# Patient Record
Sex: MALE | Race: WHITE | NOT HISPANIC OR LATINO | Employment: FULL TIME | URBAN - METROPOLITAN AREA
[De-identification: names, ages, dates, MRNs, and addresses within clinical notes are randomized per-mention and may not be internally consistent; named-entity substitution may affect disease eponyms.]

---

## 2019-04-12 ENCOUNTER — CONVERSION ENCOUNTER (OUTPATIENT)
Dept: SURGERY | Facility: CLINIC | Age: 54
End: 2019-04-12

## 2019-04-12 ENCOUNTER — OFFICE VISIT CONVERTED (OUTPATIENT)
Dept: SURGERY | Facility: CLINIC | Age: 54
End: 2019-04-12
Attending: UROLOGY

## 2019-08-29 ENCOUNTER — OFFICE VISIT CONVERTED (OUTPATIENT)
Dept: SURGERY | Facility: CLINIC | Age: 54
End: 2019-08-29
Attending: NURSE PRACTITIONER

## 2020-01-17 ENCOUNTER — HOSPITAL ENCOUNTER (OUTPATIENT)
Dept: SURGERY | Facility: HOSPITAL | Age: 55
Setting detail: HOSPITAL OUTPATIENT SURGERY
Discharge: HOME | End: 2020-01-17
Attending: SURGERY

## 2020-09-11 ENCOUNTER — OFFICE VISIT CONVERTED (OUTPATIENT)
Dept: UROLOGY | Facility: CLINIC | Age: 55
End: 2020-09-11
Attending: UROLOGY

## 2020-10-02 ENCOUNTER — PROCEDURE VISIT CONVERTED (OUTPATIENT)
Dept: UROLOGY | Facility: CLINIC | Age: 55
End: 2020-10-02
Attending: UROLOGY

## 2020-10-02 ENCOUNTER — HOSPITAL ENCOUNTER (OUTPATIENT)
Dept: SURGERY | Facility: CLINIC | Age: 55
Discharge: HOME | End: 2020-10-02
Attending: UROLOGY

## 2021-05-10 NOTE — PROCEDURES
Procedure Note      Patient Name: Reji Samuel   Patient ID: [id number]   Sex: Male   YOB: 1965    Primary Care Provider: Tan Londono MD   Referring Provider: Tan Londono MD    Visit Date: October 2, 2020    Provider: Edi Pena MD   Location: [facility] [place] [facility]   Location Address: [address] [place] [address]  [id number]   Location Phone: [phone]             After informed consent patient was taken to the procedure room.    Patient was placed supine and prepped and draped in normal sterile fashion.  Multidisciplinary timeout was been taken documenting correct patient site and procedure.  Patient had 2 lesions on the proximal left side of the penis.  1 was very small about 1 mm.  I used some 1% lidocaine plain to numb this up and excised this lesion and fulgurated the bleeding.  The second lesion was on the left lateral side was about 7 mm long and 2 cm wide.  This was numbed up with 1% lidocaine without epinephrine and then resected in an ellipse fashion.  Bleeding was fulgurated and 4-0 chromic horizontal mattress sutures were used to close the incision.    Patient tolerated procedure well we placed some bacitracin and gauze on the incision           Assessment  · Penile lesion     607.89/N48.9  · Condyloma acuminatum of penis     078.11/A63.0      Plan  · Orders  o Penile biopsy (01507) - 607.89/N48.9 - 10/02/2020  o Surgical removal of condyloma of penis (77769) - 607.89/N48.9, 078.11/A63.0 - 10/02/2020  · Medications  o Medications have been Reconciled  o Transition of Care or Provider Policy     Follow-up as needed             Electronically Signed by: Edi Pena MD -Author on October 2, 2020 05:19:54 PM

## 2021-05-13 NOTE — PROGRESS NOTES
Progress Note      Patient Name: Reji Samuel   Patient ID: [id number]   Sex: Male   YOB: 1965    Primary Care Provider: Tan Londono MD   Referring Provider: Tan Londono MD    Visit Date: September 11, 2020    Provider: Edi Pena MD   Location: [facility] [place] [facility]   Location Address: [address] [place] [address]  [id number]   Location Phone: [phone]          Chief Complaint  · pt here for urologic issues      History Of Present Illness     53yo male presents in follow up for genital warts and ED    Pt with some intermittent genital condyloma over the last few years.    Has had these burned off and also used imiquimod cream in the past.  He has had some insurance issues with the cream in the past.    pt also dealing with PE    Both these were, but he has had recurrence.    has used a cream on these in the past and this has worked.     erectile issues. Can get erections good enough for erections. difficulty getting but mostly maintaining an erection.     Sildenafil 10 mg - did help.     No urologic family history,   Has never had any urologic surgery.    No cardiopulmonary history.  0.5 ppd.  Patient does not use blood thinner.         Past Medical History  HTN (hypertension); Reflux; STD (male)         Past Surgical History  Appendectomy; Colon Polyps; Tonsillectomy; Tumor removal         Medication List  aspirin 81 mg oral tablet,delayed release (DR/EC); imiquimod 5 % topical cream in packet; lisinopril-hydrochlorothiazide 10-12.5 mg oral tablet; Prilosec 10 mg oral susp,delayed release for recon; sildenafil 20 mg; Zyrtec 10 mg oral tablet         Allergy List  Anesthetic; Codeine Sulfate       Allergies Reconciled  Family Medical History  Hypertension         Social History  Tobacco (Current every day)         Review of Systems  · Constitutional  o Denies  o : chills, fever  · Gastrointestinal  o Denies  o : nausea, vomiting      Vitals  Date Time BP  "Position Site L\R Cuff Size HR RR TEMP (F) WT  HT  BMI kg/m2 BSA m2 O2 Sat        09/11/2020 10:27 AM       17  227lbs 6oz 6'  1\" 30 2.3           Physical Examination  · Constitutional  o Appearance  o : Well-appearing, well-developed, in no acute distress  · Respiratory  o Respiratory Effort  o : Unlabored breathing  · Gastrointestinal  o Abdominal Examination  o : Nontender, nondistended, no rigidity or guarding, no hepatosplenomegaly  · Genitourinary  o Bladder  o : nonpalpable  o Penis  o : circumcised phallus with no masses or lesions  o Urethral Meatus  o : no inflammation present and no stenosis  o Scrotum and Scrotal Contents  o :   § Testes  § : Bilateral descended testicles no masses or lesions  · Neurologic  o Mental Status Examination  o :   § Orientation  § : Grossly oriented to person, place and time, judgment and insight intact, normal mood and affect         Patient has 2 condylomas on the left proximal shaft.  One is 2 mm and one is about 5 mm long by 2 mm wide.                         Assessment  · Genital warts     078.11/A63.0  · Erectile dysfunction, unspecified erectile dysfunction type     607.84/N52.9    Problems Reconciled  Plan  · Medications  o Medications have been Reconciled  o Transition of Care or Provider Policy  · Instructions  o Electronically Identified Patient Education Materials Provided Electronically     4 hours to go the emergency room or risk never having  an erection again      Follow-up in 1 year with nurse practitioner for med refill    Greater than 20 minutes was used in counseling and coordination of care, with greater than 51% of this in face-to-face counseling\">  Genital Condyloma    Imiquimod 5% topical cream packet, apply 3 times a week nightly.  No longer than 16 weeks  Risks/Benefit and side effect discussed today    We did discuss if this does not work we could schedule him for office excision of these lesions.  He will call and let me know if he wants to do " this in the future.    ED    Sildenafil 20 mg 1 to 5 tablets PRN sexual intercourse.  Risks/benefits and side effects discuss today.  Discussed with patient if he gets an erections for > 4 hours to go the emergency room or risk never having  an erection again      Follow-up in 1 year with nurse practitioner for med refill    Greater than 20 minutes was used in counseling and coordination of care, with greater than 51% of this in face-to-face counseling             Electronically Signed by: Edi Pena MD -Author on September 11, 2020 12:34:30 PM

## 2021-05-14 VITALS — RESPIRATION RATE: 17 BRPM | WEIGHT: 227.37 LBS | BODY MASS INDEX: 30.13 KG/M2 | HEIGHT: 73 IN

## 2021-05-15 VITALS — HEIGHT: 73 IN | RESPIRATION RATE: 16 BRPM | BODY MASS INDEX: 30.48 KG/M2 | WEIGHT: 230 LBS

## 2021-05-15 VITALS — BODY MASS INDEX: 29.88 KG/M2 | WEIGHT: 225.5 LBS | HEIGHT: 73 IN | RESPIRATION RATE: 14 BRPM

## 2021-09-14 ENCOUNTER — OFFICE VISIT (OUTPATIENT)
Dept: UROLOGY | Facility: CLINIC | Age: 56
End: 2021-09-14

## 2021-09-14 VITALS — BODY MASS INDEX: 29.31 KG/M2 | WEIGHT: 221.2 LBS | RESPIRATION RATE: 16 BRPM | HEIGHT: 73 IN

## 2021-09-14 DIAGNOSIS — N50.811 PAIN IN RIGHT TESTICLE: Primary | ICD-10-CM

## 2021-09-14 PROBLEM — A64 STD (MALE): Status: ACTIVE | Noted: 2021-09-14

## 2021-09-14 PROBLEM — I10 HTN (HYPERTENSION): Status: ACTIVE | Noted: 2021-09-14

## 2021-09-14 PROBLEM — K21.9 ESOPHAGEAL REFLUX: Status: ACTIVE | Noted: 2021-09-14

## 2021-09-14 LAB
BILIRUB BLD-MCNC: NEGATIVE MG/DL
CLARITY, POC: CLEAR
COLOR UR: YELLOW
GLUCOSE UR STRIP-MCNC: NEGATIVE MG/DL
KETONES UR QL: NEGATIVE
LEUKOCYTE EST, POC: NEGATIVE
NITRITE UR-MCNC: NEGATIVE MG/ML
PH UR: 7.5 [PH] (ref 5–8)
PROT UR STRIP-MCNC: NEGATIVE MG/DL
RBC # UR STRIP: ABNORMAL /UL
SP GR UR: 1.02 (ref 1–1.03)
UROBILINOGEN UR QL: NORMAL

## 2021-09-14 PROCEDURE — 99214 OFFICE O/P EST MOD 30 MIN: CPT | Performed by: NURSE PRACTITIONER

## 2021-09-14 PROCEDURE — 81003 URINALYSIS AUTO W/O SCOPE: CPT | Performed by: NURSE PRACTITIONER

## 2021-09-14 RX ORDER — ASPIRIN 81 MG/1
81 TABLET ORAL DAILY
Status: ON HOLD | COMMUNITY
End: 2021-09-23 | Stop reason: SDUPTHER

## 2021-09-14 RX ORDER — IBUPROFEN 800 MG/1
TABLET ORAL
COMMUNITY
Start: 2021-07-13 | End: 2021-09-21

## 2021-09-14 RX ORDER — LISINOPRIL AND HYDROCHLOROTHIAZIDE 25; 20 MG/1; MG/1
1 TABLET ORAL DAILY
COMMUNITY
Start: 2021-09-11 | End: 2021-09-23 | Stop reason: HOSPADM

## 2021-09-14 RX ORDER — CYCLOBENZAPRINE HCL 10 MG
10 TABLET ORAL 2 TIMES DAILY PRN
COMMUNITY
Start: 2021-06-08 | End: 2022-03-31

## 2021-09-14 RX ORDER — DOXYCYCLINE HYCLATE 100 MG/1
100 CAPSULE ORAL 2 TIMES DAILY
Qty: 42 CAPSULE | Refills: 0 | Status: SHIPPED | OUTPATIENT
Start: 2021-09-14 | End: 2021-10-05

## 2021-09-14 RX ORDER — CETIRIZINE HYDROCHLORIDE 10 MG/1
10 TABLET ORAL DAILY
COMMUNITY

## 2021-09-14 RX ORDER — OMEPRAZOLE 20 MG/1
20 CAPSULE, DELAYED RELEASE ORAL DAILY PRN
COMMUNITY

## 2021-09-14 RX ORDER — OMEPRAZOLE MAGNESIUM 10 MG/1
GRANULE, DELAYED RELEASE ORAL
COMMUNITY
End: 2021-09-14

## 2021-09-14 RX ORDER — SILDENAFIL CITRATE 20 MG/1
TABLET ORAL
Qty: 30 TABLET | Refills: 4 | Status: SHIPPED | OUTPATIENT
Start: 2021-09-14

## 2021-09-14 NOTE — PROGRESS NOTES
"Chief Complaint: Testicle Pain (having groin pain ) and Annual Exam (needing refill)    Subjective         History of Present Illness  Reji Samuel is a 55 y.o. male presents to [facility] to be seen for annual exam for condyloma and erectile dysfunction patient also states that he has had a 2-month history of testicular pain which resolves with ejaculation.    He has been on sildenafil.    States testicular pain started about 6 weeks ago with the right testicle having pain. He states that when he was able to ejaculate he had some spraying and then relief of the pain.    He states there is also a \"lump\" in his right groin.     Objective     Past Medical History:   Diagnosis Date   • Acid reflux    • HTN (hypertension)    • STD (male)        Past Surgical History:   Procedure Laterality Date   • APPENDECTOMY     • COLON SURGERY      polyps   • TONSILLECTOMY     • TUMOR REMOVAL           Current Outpatient Medications:   •  aspirin (aspirin) 81 MG EC tablet, aspirin 81 mg oral tablet,delayed release (DR/EC) take 1 tablet (81 mg) by oral route once daily   Active, Disp: , Rfl:   •  cetirizine (ZyrTEC Allergy) 10 MG tablet, Zyrtec 10 mg oral tablet take 1 tablet (10 mg) by oral route once daily   Active, Disp: , Rfl:   •  cyclobenzaprine (FLEXERIL) 10 MG tablet, , Disp: , Rfl:   •  doxycycline (VIBRAMYCIN) 100 MG capsule, Take 1 capsule by mouth 2 (Two) Times a Day for 21 days., Disp: 42 capsule, Rfl: 0  •  ibuprofen (ADVIL,MOTRIN) 800 MG tablet, , Disp: , Rfl:   •  lisinopril-hydrochlorothiazide (PRINZIDE,ZESTORETIC) 20-25 MG per tablet, , Disp: , Rfl:   •  omeprazole (priLOSEC) 20 MG capsule, , Disp: , Rfl:   •  sildenafil (REVATIO) 20 MG tablet, 1-5 tabs PRN sexual intercourse, Disp: 30 tablet, Rfl: 4    Allergies   Allergen Reactions   • Benzocaine Unknown - Low Severity   • Codeine Unknown - Low Severity        Family History   Problem Relation Age of Onset   • Hypertension Mother    • " "Hypertension Father    • Prostate cancer Paternal Uncle        Social History     Socioeconomic History   • Marital status:      Spouse name: Not on file   • Number of children: Not on file   • Years of education: Not on file   • Highest education level: Not on file   Tobacco Use   • Smoking status: Current Every Day Smoker   • Smokeless tobacco: Never Used   Vaping Use   • Vaping Use: Never used       Vital Signs:   Resp 16   Ht 185.4 cm (73\")   Wt 100 kg (221 lb 3.2 oz)   BMI 29.18 kg/m²      Physical Exam  Genitourinary:     Testes:         Right: Mass (Nickel sized with tenderness to palpation closer to groin) and tenderness present.          Result Review :   The following data was reviewed by: BRIELLE Gold on 09/14/2021:  Results for orders placed or performed in visit on 09/14/21   POC Urinalysis Dipstick, Automated    Specimen: Urine   Result Value Ref Range    Color Yellow Yellow, Straw, Dark Yellow, Yulia    Clarity, UA Clear Clear    Specific Gravity  1.020 1.005 - 1.030    pH, Urine 7.5 5.0 - 8.0    Leukocytes Negative Negative    Nitrite, UA Negative Negative    Protein, POC Negative Negative mg/dL    Glucose, UA Negative Negative, 1000 mg/dL (3+) mg/dL    Ketones, UA Negative Negative    Urobilinogen, UA Normal Normal    Bilirubin Negative Negative    Blood, UA Trace (A) Negative          Procedures        Assessment and Plan {CC Problem List  Visit Diagnosis  ROS  Review (Popup)  Health Maintenance  Quality  BestPractice  Medications  SmartSets  SnapShot Encounters  Media :23}   Diagnoses and all orders for this visit:    1. Pain in right testicle (Primary)  -     POC Urinalysis Dipstick, Automated  -     doxycycline (VIBRAMYCIN) 100 MG capsule; Take 1 capsule by mouth 2 (Two) Times a Day for 21 days.  Dispense: 42 capsule; Refill: 0  -     US Scrotum & Testicles; Future  -     sildenafil (REVATIO) 20 MG tablet; 1-5 tabs PRN sexual intercourse  Dispense: 30 tablet; " Refill: 4         Discussed with patient at this point in time his testicular pain may be related to a possible infectious or inflammatory issue however given palpable mass on right testicle/inguinal area we will order a testicular ultrasound and follow-up with him in 4 weeks.      I spent 10  minutes caring for Reji on this date of service. This time includes time spent by me in the following activities:reviewing tests, obtaining and/or reviewing a separately obtained history, performing a medically appropriate examination and/or evaluation , counseling and educating the patient/family/caregiver, ordering medications, tests, or procedures, and documenting information in the medical record  Follow Up   Return in about 4 weeks (around 10/12/2021) for f/u testicular pain .  Patient was given instructions and counseling regarding his condition or for health maintenance advice. Please see specific information pulled into the AVS if appropriate.         This document has been electronically signed by BRIELLE Gold  September 14, 2021 11:25 EDT

## 2021-09-21 ENCOUNTER — APPOINTMENT (OUTPATIENT)
Dept: CARDIOLOGY | Facility: HOSPITAL | Age: 56
End: 2021-09-21

## 2021-09-21 ENCOUNTER — HOSPITAL ENCOUNTER (INPATIENT)
Facility: HOSPITAL | Age: 56
LOS: 1 days | Discharge: HOME | End: 2021-09-23
Attending: EMERGENCY MEDICINE | Admitting: INTERNAL MEDICINE

## 2021-09-21 ENCOUNTER — APPOINTMENT (OUTPATIENT)
Dept: GENERAL RADIOLOGY | Facility: HOSPITAL | Age: 56
End: 2021-09-21

## 2021-09-21 DIAGNOSIS — I21.4 NSTEMI, INITIAL EPISODE OF CARE (HCC): ICD-10-CM

## 2021-09-21 DIAGNOSIS — R07.9 CHEST PAIN, UNSPECIFIED TYPE: Primary | ICD-10-CM

## 2021-09-21 PROBLEM — IMO0001 SMOKING: Status: ACTIVE | Noted: 2021-09-21

## 2021-09-21 PROBLEM — F41.9 ANXIETY: Status: ACTIVE | Noted: 2021-09-21

## 2021-09-21 PROBLEM — F17.200 SMOKING: Status: ACTIVE | Noted: 2021-09-21

## 2021-09-21 LAB
ALBUMIN SERPL-MCNC: 4.3 G/DL (ref 3.5–5.2)
ALBUMIN/GLOB SERPL: 2 G/DL
ALP SERPL-CCNC: 52 U/L (ref 39–117)
ALT SERPL W P-5'-P-CCNC: 25 U/L (ref 1–41)
ANION GAP SERPL CALCULATED.3IONS-SCNC: 9.7 MMOL/L (ref 5–15)
AST SERPL-CCNC: 23 U/L (ref 1–40)
BASOPHILS # BLD AUTO: 0.12 10*3/MM3 (ref 0–0.2)
BASOPHILS NFR BLD AUTO: 0.9 % (ref 0–1.5)
BH CV ECHO MEAS - AO ROOT DIAM: 2.2 CM
BH CV ECHO MEAS - EDV(MOD-SP2): 89 ML
BH CV ECHO MEAS - EDV(MOD-SP4): 87 ML
BH CV ECHO MEAS - EF(MOD-BP): 64 %
BH CV ECHO MEAS - ESV(MOD-SP2): 28 ML
BH CV ECHO MEAS - ESV(MOD-SP4): 37 ML
BH CV ECHO MEAS - IVSD: 1.1 CM
BH CV ECHO MEAS - LA DIMENSION(2D): 2.2 CM
BH CV ECHO MEAS - LAT PEAK E' VEL: 9 CM/SEC
BH CV ECHO MEAS - LVIDD: 4 CM
BH CV ECHO MEAS - LVIDS: 2.1 CM
BH CV ECHO MEAS - LVPWD: 0.9 CM
BH CV ECHO MEAS - MED PEAK E' VEL: 6 CM/SEC
BH CV ECHO MEAS - MV A MAX VEL: 47 CM/SEC
BH CV ECHO MEAS - MV DEC TIME: 211 MSEC
BH CV ECHO MEAS - MV E MAX VEL: 44 CM/SEC
BH CV ECHO MEAS - MV E/A: 0.9
BH CV ECHO MEASUREMENTS AVERAGE E/E' RATIO: 5.87
BILIRUB SERPL-MCNC: 0.4 MG/DL (ref 0–1.2)
BUN SERPL-MCNC: 13 MG/DL (ref 6–20)
BUN/CREAT SERPL: 13 (ref 7–25)
CALCIUM SPEC-SCNC: 9.1 MG/DL (ref 8.6–10.5)
CHLORIDE SERPL-SCNC: 101 MMOL/L (ref 98–107)
CK MB SERPL-CCNC: 2.9 NG/ML
CK SERPL-CCNC: 236 U/L (ref 20–200)
CO2 SERPL-SCNC: 26.3 MMOL/L (ref 22–29)
CREAT SERPL-MCNC: 1 MG/DL (ref 0.76–1.27)
DEPRECATED RDW RBC AUTO: 44.9 FL (ref 37–54)
EOSINOPHIL # BLD AUTO: 0.56 10*3/MM3 (ref 0–0.4)
EOSINOPHIL NFR BLD AUTO: 4 % (ref 0.3–6.2)
ERYTHROCYTE [DISTWIDTH] IN BLOOD BY AUTOMATED COUNT: 13.4 % (ref 12.3–15.4)
GFR SERPL CREATININE-BSD FRML MDRD: 78 ML/MIN/1.73
GLOBULIN UR ELPH-MCNC: 2.2 GM/DL
GLUCOSE SERPL-MCNC: 130 MG/DL (ref 65–99)
HCT VFR BLD AUTO: 45.4 % (ref 37.5–51)
HGB BLD-MCNC: 16.1 G/DL (ref 13–17.7)
HOLD SPECIMEN: NORMAL
IMM GRANULOCYTES # BLD AUTO: 0.06 10*3/MM3 (ref 0–0.05)
IMM GRANULOCYTES NFR BLD AUTO: 0.4 % (ref 0–0.5)
IVRT: 66 MSEC
LEFT ATRIUM VOLUME INDEX: 15 ML/M2
LIPASE SERPL-CCNC: 41 U/L (ref 13–60)
LYMPHOCYTES # BLD AUTO: 5.15 10*3/MM3 (ref 0.7–3.1)
LYMPHOCYTES NFR BLD AUTO: 36.6 % (ref 19.6–45.3)
MAGNESIUM SERPL-MCNC: 1.9 MG/DL (ref 1.6–2.6)
MAXIMAL PREDICTED HEART RATE: 165 BPM
MCH RBC QN AUTO: 32.5 PG (ref 26.6–33)
MCHC RBC AUTO-ENTMCNC: 35.5 G/DL (ref 31.5–35.7)
MCV RBC AUTO: 91.5 FL (ref 79–97)
MONOCYTES # BLD AUTO: 1.19 10*3/MM3 (ref 0.1–0.9)
MONOCYTES NFR BLD AUTO: 8.5 % (ref 5–12)
NEUTROPHILS NFR BLD AUTO: 49.6 % (ref 42.7–76)
NEUTROPHILS NFR BLD AUTO: 6.98 10*3/MM3 (ref 1.7–7)
NRBC BLD AUTO-RTO: 0 /100 WBC (ref 0–0.2)
NT-PROBNP SERPL-MCNC: 38.2 PG/ML (ref 0–900)
PLATELET # BLD AUTO: 291 10*3/MM3 (ref 140–450)
PMV BLD AUTO: 9.4 FL (ref 6–12)
POTASSIUM SERPL-SCNC: 3.5 MMOL/L (ref 3.5–5.2)
PROT SERPL-MCNC: 6.5 G/DL (ref 6–8.5)
QT INTERVAL: 419 MS
QT INTERVAL: 443 MS
RBC # BLD AUTO: 4.96 10*6/MM3 (ref 4.14–5.8)
SODIUM SERPL-SCNC: 137 MMOL/L (ref 136–145)
STRESS TARGET HR: 140 BPM
TROPONIN I SERPL-MCNC: 0.07 NG/ML (ref 0–0.6)
TROPONIN I SERPL-MCNC: 0.24 NG/ML (ref 0–0.6)
TROPONIN T SERPL-MCNC: 0.08 NG/ML (ref 0–0.03)
TROPONIN T SERPL-MCNC: 0.1 NG/ML (ref 0–0.03)
WBC # BLD AUTO: 14.06 10*3/MM3 (ref 3.4–10.8)
WHOLE BLOOD HOLD SPECIMEN: NORMAL
WHOLE BLOOD HOLD SPECIMEN: NORMAL

## 2021-09-21 PROCEDURE — 80053 COMPREHEN METABOLIC PANEL: CPT | Performed by: EMERGENCY MEDICINE

## 2021-09-21 PROCEDURE — G0378 HOSPITAL OBSERVATION PER HR: HCPCS

## 2021-09-21 PROCEDURE — 25010000002 MORPHINE PER 10 MG: Performed by: INTERNAL MEDICINE

## 2021-09-21 PROCEDURE — 83690 ASSAY OF LIPASE: CPT | Performed by: EMERGENCY MEDICINE

## 2021-09-21 PROCEDURE — 83880 ASSAY OF NATRIURETIC PEPTIDE: CPT | Performed by: EMERGENCY MEDICINE

## 2021-09-21 PROCEDURE — 93005 ELECTROCARDIOGRAM TRACING: CPT | Performed by: EMERGENCY MEDICINE

## 2021-09-21 PROCEDURE — 93306 TTE W/DOPPLER COMPLETE: CPT

## 2021-09-21 PROCEDURE — 99244 OFF/OP CNSLTJ NEW/EST MOD 40: CPT | Performed by: INTERNAL MEDICINE

## 2021-09-21 PROCEDURE — 36415 COLL VENOUS BLD VENIPUNCTURE: CPT | Performed by: INTERNAL MEDICINE

## 2021-09-21 PROCEDURE — 83735 ASSAY OF MAGNESIUM: CPT | Performed by: EMERGENCY MEDICINE

## 2021-09-21 PROCEDURE — 84484 ASSAY OF TROPONIN QUANT: CPT | Performed by: INTERNAL MEDICINE

## 2021-09-21 PROCEDURE — 25010000002 ENOXAPARIN PER 10 MG: Performed by: INTERNAL MEDICINE

## 2021-09-21 PROCEDURE — 93010 ELECTROCARDIOGRAM REPORT: CPT | Performed by: INTERNAL MEDICINE

## 2021-09-21 PROCEDURE — 84484 ASSAY OF TROPONIN QUANT: CPT

## 2021-09-21 PROCEDURE — 85025 COMPLETE CBC W/AUTO DIFF WBC: CPT | Performed by: EMERGENCY MEDICINE

## 2021-09-21 PROCEDURE — 82553 CREATINE MB FRACTION: CPT | Performed by: EMERGENCY MEDICINE

## 2021-09-21 PROCEDURE — 82550 ASSAY OF CK (CPK): CPT | Performed by: EMERGENCY MEDICINE

## 2021-09-21 PROCEDURE — 93306 TTE W/DOPPLER COMPLETE: CPT | Performed by: INTERNAL MEDICINE

## 2021-09-21 PROCEDURE — 25010000002 MORPHINE PER 10 MG: Performed by: EMERGENCY MEDICINE

## 2021-09-21 PROCEDURE — 99223 1ST HOSP IP/OBS HIGH 75: CPT | Performed by: INTERNAL MEDICINE

## 2021-09-21 PROCEDURE — 71045 X-RAY EXAM CHEST 1 VIEW: CPT

## 2021-09-21 PROCEDURE — 99285 EMERGENCY DEPT VISIT HI MDM: CPT

## 2021-09-21 RX ORDER — ALUMINA, MAGNESIA, AND SIMETHICONE 2400; 2400; 240 MG/30ML; MG/30ML; MG/30ML
15 SUSPENSION ORAL ONCE
Status: COMPLETED | OUTPATIENT
Start: 2021-09-21 | End: 2021-09-21

## 2021-09-21 RX ORDER — POLYETHYLENE GLYCOL 3350 17 G/17G
17 POWDER, FOR SOLUTION ORAL DAILY PRN
Status: DISCONTINUED | OUTPATIENT
Start: 2021-09-21 | End: 2021-09-23 | Stop reason: HOSPADM

## 2021-09-21 RX ORDER — LIDOCAINE HYDROCHLORIDE 20 MG/ML
15 SOLUTION OROPHARYNGEAL ONCE
Status: COMPLETED | OUTPATIENT
Start: 2021-09-21 | End: 2021-09-21

## 2021-09-21 RX ORDER — SODIUM CHLORIDE 0.9 % (FLUSH) 0.9 %
10 SYRINGE (ML) INJECTION AS NEEDED
Status: DISCONTINUED | OUTPATIENT
Start: 2021-09-21 | End: 2021-09-23 | Stop reason: HOSPADM

## 2021-09-21 RX ORDER — SODIUM CHLORIDE 0.9 % (FLUSH) 0.9 %
10 SYRINGE (ML) INJECTION EVERY 12 HOURS SCHEDULED
Status: DISCONTINUED | OUTPATIENT
Start: 2021-09-21 | End: 2021-09-23 | Stop reason: HOSPADM

## 2021-09-21 RX ORDER — AMOXICILLIN 250 MG
2 CAPSULE ORAL 2 TIMES DAILY
Status: DISCONTINUED | OUTPATIENT
Start: 2021-09-21 | End: 2021-09-23 | Stop reason: HOSPADM

## 2021-09-21 RX ORDER — ASPIRIN 81 MG/1
324 TABLET, CHEWABLE ORAL ONCE
Status: DISCONTINUED | OUTPATIENT
Start: 2021-09-21 | End: 2021-09-22

## 2021-09-21 RX ORDER — ATORVASTATIN CALCIUM 40 MG/1
40 TABLET, FILM COATED ORAL NIGHTLY
Status: DISCONTINUED | OUTPATIENT
Start: 2021-09-21 | End: 2021-09-23 | Stop reason: HOSPADM

## 2021-09-21 RX ORDER — ASPIRIN 81 MG/1
81 TABLET ORAL DAILY
Status: DISCONTINUED | OUTPATIENT
Start: 2021-09-22 | End: 2021-09-22

## 2021-09-21 RX ORDER — BISACODYL 5 MG/1
5 TABLET, DELAYED RELEASE ORAL DAILY PRN
Status: DISCONTINUED | OUTPATIENT
Start: 2021-09-21 | End: 2021-09-23 | Stop reason: HOSPADM

## 2021-09-21 RX ORDER — ONDANSETRON 2 MG/ML
4 INJECTION INTRAMUSCULAR; INTRAVENOUS EVERY 6 HOURS PRN
Status: DISCONTINUED | OUTPATIENT
Start: 2021-09-21 | End: 2021-09-22

## 2021-09-21 RX ORDER — METOPROLOL SUCCINATE 25 MG/1
25 TABLET, EXTENDED RELEASE ORAL
Status: DISCONTINUED | OUTPATIENT
Start: 2021-09-21 | End: 2021-09-23 | Stop reason: HOSPADM

## 2021-09-21 RX ORDER — NICOTINE 21 MG/24HR
1 PATCH, TRANSDERMAL 24 HOURS TRANSDERMAL EVERY 24 HOURS
Status: DISCONTINUED | OUTPATIENT
Start: 2021-09-21 | End: 2021-09-23 | Stop reason: HOSPADM

## 2021-09-21 RX ORDER — CYCLOBENZAPRINE HCL 5 MG
5 TABLET ORAL 3 TIMES DAILY PRN
Status: DISCONTINUED | OUTPATIENT
Start: 2021-09-21 | End: 2021-09-23 | Stop reason: HOSPADM

## 2021-09-21 RX ORDER — ASPIRIN 81 MG/1
81 TABLET ORAL DAILY
Status: DISCONTINUED | OUTPATIENT
Start: 2021-09-21 | End: 2021-09-21

## 2021-09-21 RX ORDER — PANTOPRAZOLE SODIUM 40 MG/1
40 TABLET, DELAYED RELEASE ORAL
Status: DISCONTINUED | OUTPATIENT
Start: 2021-09-21 | End: 2021-09-23 | Stop reason: HOSPADM

## 2021-09-21 RX ORDER — BISACODYL 10 MG
10 SUPPOSITORY, RECTAL RECTAL DAILY PRN
Status: DISCONTINUED | OUTPATIENT
Start: 2021-09-21 | End: 2021-09-23 | Stop reason: HOSPADM

## 2021-09-21 RX ORDER — CHOLECALCIFEROL (VITAMIN D3) 125 MCG
5 CAPSULE ORAL NIGHTLY PRN
Status: DISCONTINUED | OUTPATIENT
Start: 2021-09-21 | End: 2021-09-23 | Stop reason: HOSPADM

## 2021-09-21 RX ORDER — NITROGLYCERIN 0.4 MG/1
0.4 TABLET SUBLINGUAL
Status: DISCONTINUED | OUTPATIENT
Start: 2021-09-21 | End: 2021-09-23 | Stop reason: HOSPADM

## 2021-09-21 RX ORDER — MULTIPLE VITAMINS W/ MINERALS TAB 9MG-400MCG
1 TAB ORAL DAILY
Status: DISCONTINUED | OUTPATIENT
Start: 2021-09-21 | End: 2021-09-23 | Stop reason: HOSPADM

## 2021-09-21 RX ADMIN — MORPHINE SULFATE 4 MG: 4 INJECTION INTRAVENOUS at 08:29

## 2021-09-21 RX ADMIN — SODIUM CHLORIDE, PRESERVATIVE FREE 10 ML: 5 INJECTION INTRAVENOUS at 21:30

## 2021-09-21 RX ADMIN — Medication 5 MG: at 21:30

## 2021-09-21 RX ADMIN — NITROGLYCERIN 0.5 INCH: 20 OINTMENT TOPICAL at 08:27

## 2021-09-21 RX ADMIN — MULTIPLE VITAMINS W/ MINERALS TAB 1 TABLET: TAB at 12:19

## 2021-09-21 RX ADMIN — ATORVASTATIN CALCIUM 40 MG: 40 TABLET, FILM COATED ORAL at 21:30

## 2021-09-21 RX ADMIN — ENOXAPARIN SODIUM 100 MG: 100 INJECTION SUBCUTANEOUS at 17:34

## 2021-09-21 RX ADMIN — PANTOPRAZOLE SODIUM 40 MG: 40 TABLET, DELAYED RELEASE ORAL at 12:19

## 2021-09-21 RX ADMIN — MORPHINE SULFATE 4 MG: 4 INJECTION, SOLUTION INTRAMUSCULAR; INTRAVENOUS at 12:23

## 2021-09-21 RX ADMIN — METOPROLOL SUCCINATE 25 MG: 25 TABLET, EXTENDED RELEASE ORAL at 17:34

## 2021-09-21 RX ADMIN — LIDOCAINE HYDROCHLORIDE 15 ML: 20 SOLUTION ORAL; TOPICAL at 12:21

## 2021-09-21 RX ADMIN — SODIUM CHLORIDE, PRESERVATIVE FREE 10 ML: 5 INJECTION INTRAVENOUS at 12:22

## 2021-09-21 RX ADMIN — NICOTINE 1 PATCH: 21 PATCH, EXTENDED RELEASE TRANSDERMAL at 12:20

## 2021-09-21 RX ADMIN — ALUMINUM HYDROXIDE, MAGNESIUM HYDROXIDE, AND DIMETHICONE 15 ML: 400; 400; 40 SUSPENSION ORAL at 12:21

## 2021-09-21 NOTE — H&P
[facility]   HOSPITALIST HISTORY AND PHYSICAL  Date: 2021   Patient Name: Reji Samuel  : 1965  MRN: [id number]  Primary Care Physician:  Tan Londono MD  Date of admission: 2021    Subjective   Subjective     Chief Complaint: Chest pain    HPI:  Reji Samuel is a 55 y.o. male with a past medical history significant for hypertension, seasonal allergies, recently placed on doxycycline for epididymitis who presents with a several day history of chest pain, patient states that this first started as left arm pain and worsened to a pressure in his chest.  Both times he had severe pain he states was after eating dinner.  He did have some associated shortness of breath, and some palpitations.  He denied any diaphoresis.  Because of worsening symptoms he presented to the emergency department for further evaluation.  In the emergency department the patient underwent EKG which was negative for any ischemic changes, the patient underwent troponin x1 which was negative, the patient was pain-free after administration of nitroglycerin.  Because of his past medical history and character of symptoms the hospitalist service was asked admit for further management.      Personal History     Past Medical History:  Past Medical History:   Diagnosis Date   • Acid reflux    • HTN (hypertension)    • STD (male)      Past Surgical History:  Past Surgical History:   Procedure Laterality Date   • APPENDECTOMY     • COLONOSCOPY     • TONSILLECTOMY     • TUMOR REMOVAL       Family History:   Family History   Problem Relation Age of Onset   • Hypertension Mother    • Hypertension Father    • Prostate cancer Paternal Uncle      Social History:   Current everyday smoker, occasional alcohol use, denies illicit drug use    Home Medications:  aspirin, cetirizine, cyclobenzaprine, doxycycline, ibuprofen, lisinopril-hydrochlorothiazide, omeprazole, and sildenafil    Allergies:  Allergies   Allergen Reactions   •  Benzocaine Unknown - Low Severity   • Codeine Unknown - Low Severity       Review of Systems   10 point review of systems negative other than stated in HPI    Objective   Objective     Vitals:   Temp:  [98.2 °F (36.8 °C)] 98.2 °F (36.8 °C)  Heart Rate:  [61] 61  Resp:  [12] 12  BP: (122)/(82) 122/82  Flow (L/min):  [2] 2    Physical Exam    Constitutional: Awake, alert, no acute distress   Eyes: Pupils equal, sclerae anicteric, no conjunctival injection   HENT: NCAT, mucous membranes moist   Neck: Supple, no thyromegaly, no lymphadenopathy, trachea midline   Respiratory: Clear to auscultation bilaterally, nonlabored respirations    Cardiovascular: RRR, no murmurs, rubs, or gallops   Gastrointestinal: Positive bowel sounds, soft, nontender, nondistended   Musculoskeletal: No bilateral ankle edema, no clubbing or cyanosis to extremities   Psychiatric: Appropriate affect, cooperative   Neurologic: Oriented x 3, strength symmetric in all extremities, Cranial Nerves grossly intact to confrontation, speech clear   Skin: No rashes     Result Review:  I have personally reviewed the results from the time of this admission to 9/21/2021 09:21 EDT and agree with these findings:  [x]  Laboratory  [x]  Microbiology  [x]  Radiology  []  EKG/Telemetry   []  Cardiology/Vascular   []  Pathology  []  Old records  []  Other:      Assessment/Plan   Assessment / Plan     Assessment:   Chest pain  GERD  Hypertension  Leukocytosis    Plan:  • Patient admitted to the hospital for further work-up and management of above  • Cardiology consulted, appreciate their recommendations  • Initial troponin negative, trend every 6 hours  • Repeat EKG now, initial EKG nonischemic  • Keep n.p.o. pending cardiology evaluation  • GI cocktail x1  • Per document reviewed, patient is on doxycycline, question possibility of pill esophagitis  • Start Protonix  • Start atorvastatin  • Monitor blood pressure, resume home antihypertensives as needed  • Check  echocardiogram  • Clinical course will dictate further management    DVT prophylaxis: SCDs  GI: Protonix  Diet: N.p.o. after midnight   telemetry: Reviewed by me, sinus rhythm    Reviewed patients labs and imaging, and discussed with patient and nurse at bedside, discussed with emergency room physician Dr. Purcell, discussed with cardiology    CODE STATUS:    Code Status: CPR  Medical Interventions (Level of Support Prior to Arrest): Full      Admission Status:  I believe this patient meets observation status.      Electronically signed by Vidal Morillo MD, 09/21/21, 9:21 AM EDT.

## 2021-09-21 NOTE — CONSULTS
Serial troponin was rising consistent with NSTEMI. Will cancel stress test and proceed with C this AM. PT understands and is agreeable to proceed. Please keep him NPO.       [facility]   Cardiology Consult Note    Patient Name: Reji Samuel  : 1965  MRN: [id number]  Primary Care Physician:  Tan Londono MD  Referring Physician: No ref. provider found  Date of admission: 2021    Subjective   Subjective     Reason for Consult/ Chief Complaint: Chest pain    HPI:  Reji Samuel is a 55 y.o. male with hypertension, active smoking and anxiety disorder, presented to hospital with chest discomfort.  He started having recurrent anterior chest discomfort on Friday.  His discomfort was intermittent.  It was associated with palpitations and nausea.  He had no other associated symptoms.  His chest discomfort was relieved by sublingual nitroglycerin.  It was radiating to both arms.  It varied in duration.  He had similar chest discomfort about 2 years ago, however, it was milder in intensity.  At that time, it seems that he was hospitalized for ACS rule out but had no cardiac stress test.  He gets occasional chest discomfort related to anxiety attacks, however, the chest discomfort he has been experiencing since Friday was different in character.    Patient has no other complaints.  He has no fever, chills, cough, orthopnea, edema, presyncope or syncope.    Review of Systems   All systems were reviewed and negative except as mentioned in HPI    Personal History     Past Medical History:   Diagnosis Date   • Acid reflux    • HTN (hypertension)    • STD (male)               Family History: family history includes Hypertension in his father and mother; Prostate cancer in his paternal uncle. Otherwise pertinent FHx was reviewed and not pertinent to current issue.    Social History:  reports that he has been smoking cigarettes. He has been smoking about 1.00 pack per day. He has never used smokeless  tobacco. He reports previous alcohol use. He reports previous drug use.    Home Medications:  aspirin, cetirizine, cyclobenzaprine, doxycycline, lisinopril-hydrochlorothiazide, omeprazole, and sildenafil    Allergies:  Allergies   Allergen Reactions   • Benzocaine Hives   • Codeine Nausea And Vomiting       Objective    Objective     Vitals:   Temp:  [97.9 °F (36.6 °C)-98.2 °F (36.8 °C)] 97.9 °F (36.6 °C)  Heart Rate:  [55-62] 61  Resp:  [8-18] 18  BP: (122-123)/(71-82) 123/77  Flow (L/min):  [2] 2      Physical Exam:   Constitutional: Awake, alert, No acute distress    Eyes: PERRLA, sclerae anicteric, no conjunctival injection   HENT: NCAT, mucous membranes moist   Neck: Supple, no thyromegaly, no lymphadenopathy, trachea midline   Respiratory: Clear to auscultation bilaterally, nonlabored respirations    Cardiovascular: RRR, no murmurs, rubs, or gallops, palpable pedal pulses bilaterally   Gastrointestinal: Positive bowel sounds, soft, nontender, nondistended   Musculoskeletal: No bilateral ankle edema, no clubbing or cyanosis to extremities   Psychiatric: Appropriate affect, cooperative   Neurologic: Oriented x 3, strength symmetric in all extremities, Cranial Nerves grossly intact to confrontation, speech clear   Skin: No rashes     Result Review    Result Review:  I have personally reviewed the results from the time of this admission to 9/21/2021 15:48 EDT and agree with these findings:  [x]  Laboratory  []  Microbiology  [x]  Radiology  [x]  EKG/Telemetry   [x]  Cardiology/Vascular   []  Pathology  [x]  Old records  []  Other:  Most notable findings include:     CMP    CMP 9/21/21   Glucose 130 (A)   BUN 13   Creatinine 1.00   eGFR Non African Am 78   Sodium 137   Potassium 3.5   Chloride 101   Calcium 9.1   Albumin 4.30   Total Bilirubin 0.4   Alkaline Phosphatase 52   AST (SGOT) 23   ALT (SGPT) 25   (A) Abnormal value       Comments are available for some flowsheets but are not being displayed.             CBC    CBC 9/21/21   WBC 14.06 (A)   RBC 4.96   Hemoglobin 16.1   Hematocrit 45.4   MCV 91.5   MCH 32.5   MCHC 35.5   RDW 13.4   Platelets 291   (A) Abnormal value             No results found for: TROPONINT      Assessment/Plan   Assessment / Plan       Active Hospital Problems:  Active Hospital Problems    Diagnosis    • Chest pain    • Smoking    • Anxiety    • Essential hypertension          Plan:   1-chest pain with typical and atypical features: He is currently chest pain-free.  His exam is benign.  ECG was noted and shows sinus rhythm without ischemic ST-T changes.  First 2 troponins were unremarkable.  Continue serial troponin check.  Echo will be done to assess LVEF, wall motion and valvular function.  He will be scheduled for exercise nuclear stress test tomorrow morning.  Continue aspirin, statin sublingual nitroglycerin.  We will start p.o. metoprolol and will give him Lovenox 1 mg/kilogram subcu x1 dose.    2.  Palpitations: Etiology is uncertain.  Could be related to anxiety and excessive caffeine intake.  Continue cardiac monitoring.  He was advised to avoid caffeine.    3.  Active smoking: The importance of smoking cessation was explained to him.  He is interested in quitting.  Continue nicotine patch.    4.  Hypertension: Blood pressure stable.  He is normally on lisinopril.  Will switch him to metoprolol as above.    Thank you for the consultation.  We will continue to follow along with you.      Electronically signed by Sudhir Herrera MD, 09/21/21, 3:44 PM EDT.

## 2021-09-21 NOTE — ED PROVIDER NOTES
"Time: 7:33 AM EDT  Arrived by: EMS  Chief Complaint: chest pain  History provided by: pt  History is limited by: N/A     History of Present Illness:    Reji Samuel is a 55 y.o. male who presents to the emergency department today with complaints of chest pain over the past three days. Pt states he has had a couple episodes of this pain over the past few days but notes this morning was the worst one yet. He describes it as a \"low nagging muscle ache\" in nature that radiates to bilateral arms. Pt was given a NTG with relief. He complains of associated nausea. He follows with Dr. Bry MD, for his primary care needs. He denies SOB, diaphoresis, chills, abdominal pain, emesis, neck pain, jaw pain, cough, or any other pertinent sx or concerns.       History provided by:  Patient   used: No    Chest Pain  Pain location:  L chest  Pain quality: pressure    Pain radiates to:  L arm  Pain severity:  Moderate  Onset quality:  Sudden  Duration:  2 days  Timing:  Intermittent  Progression:  Worsening  Relieved by:  Nothing  Worsened by:  Nothing  Associated symptoms: nausea    Associated symptoms: no abdominal pain, no cough, no fever, no headache, no palpitations, no shortness of breath and no vomiting      Patient Care Team  Primary Care Provider: Tan Londono MD    Past Medical History:     Allergies   Allergen Reactions   • Benzocaine Hives   • Codeine Nausea And Vomiting     Past Medical History:   Diagnosis Date   • Acid reflux    • HTN (hypertension)    • STD (male)      Past Surgical History:   Procedure Laterality Date   • APPENDECTOMY     • COLONOSCOPY     • TONSILLECTOMY     • TUMOR REMOVAL       Family History   Problem Relation Age of Onset   • Hypertension Mother    • Hypertension Father    • Prostate cancer Paternal Uncle        Home Medications:  Prior to Admission medications    Medication Sig Start Date End Date Taking? Authorizing Provider   aspirin (aspirin) 81 MG EC tablet " aspirin 81 mg oral tablet,delayed release (DR/EC) take 1 tablet (81 mg) by oral route once daily   Active    Provider, MD [facility]   cetirizine (ZyrTEC Allergy) 10 MG tablet Zyrtec 10 mg oral tablet take 1 tablet (10 mg) by oral route once daily   Active    Provider, MD [facility]oc   cyclobenzaprine (FLEXERIL) 10 MG tablet  6/8/21   Enoc Schwarz MD   doxycycline (VIBRAMYCIN) 100 MG capsule Take 1 capsule by mouth 2 (Two) Times a Day for 21 days. 9/14/21 10/5/21  Marquita Bynum APRN   ibuprofen (ADVIL,MOTRIN) 800 MG tablet  7/13/21   Enoc Schwarz MD   lisinopril-hydrochlorothiazide (PRINZIDE,ZESTORETIC) 20-25 MG per tablet  9/11/21   Enoc Schwarz MD   omeprazole (priLOSEC) 20 MG capsule     Enoc Schwarz MD   sildenafil (REVATIO) 20 MG tablet 1-5 tabs PRN sexual intercourse 9/14/21   Marquita Bynum APRN        Social History:   Social History     Tobacco Use   • Smoking status: Current Every Day Smoker     Packs/day: 1.00     Types: Cigarettes   • Smokeless tobacco: Never Used   Vaping Use   • Vaping Use: Never used   Substance Use Topics   • Alcohol use: Not Currently   • Drug use: Not Currently     Recent travel: no     Review of Systems:  Review of Systems   Constitutional: Negative for chills and fever.   HENT: Negative for congestion, rhinorrhea and sore throat.    Eyes: Negative for pain and visual disturbance.   Respiratory: Negative for apnea, cough, chest tightness and shortness of breath.    Cardiovascular: Positive for chest pain. Negative for palpitations.   Gastrointestinal: Positive for nausea. Negative for abdominal pain, diarrhea and vomiting.   Genitourinary: Negative for difficulty urinating and dysuria.   Musculoskeletal: Negative for joint swelling and myalgias.   Skin: Negative for color change.   Neurological: Negative for seizures and headaches.   Psychiatric/Behavioral: Negative.    All other systems reviewed and are negative.       Physical  "Exam:  /77 (BP Location: Right arm, Patient Position: Lying)   Pulse 61   Temp 97.9 °F (36.6 °C) (Oral)   Resp 18   Ht 185.4 cm (73\")   Wt 101 kg (222 lb 3.6 oz)   SpO2 98%   BMI 29.32 kg/m²     Physical Exam  Vitals and nursing note reviewed.   Constitutional:       General: He is not in acute distress.     Appearance: Normal appearance. He is not toxic-appearing.   HENT:      Head: Normocephalic and atraumatic.      Jaw: There is normal jaw occlusion.   Eyes:      General: Lids are normal.      Extraocular Movements: Extraocular movements intact.      Conjunctiva/sclera: Conjunctivae normal.      Pupils: Pupils are equal, round, and reactive to light.   Cardiovascular:      Rate and Rhythm: Normal rate and regular rhythm.      Pulses: Normal pulses.      Heart sounds: Normal heart sounds.   Pulmonary:      Effort: Pulmonary effort is normal. No respiratory distress.      Breath sounds: Normal breath sounds. No wheezing or rhonchi.   Abdominal:      General: Abdomen is flat.      Palpations: Abdomen is soft.      Tenderness: There is no abdominal tenderness. There is no guarding or rebound.   Musculoskeletal:         General: Normal range of motion.      Cervical back: Normal range of motion and neck supple.      Right lower leg: No edema.      Left lower leg: No edema.   Skin:     General: Skin is warm and dry.   Neurological:      Mental Status: He is alert and oriented to person, place, and time. Mental status is at baseline.   Psychiatric:         Mood and Affect: Mood normal.                Medications in the Emergency Department:  Medications   sodium chloride 0.9 % flush 10 mL (has no administration in time range)   aspirin chewable tablet 324 mg (324 mg Oral Not Given 9/21/21 0999)   cyclobenzaprine (FLEXERIL) tablet 5 mg (has no administration in time range)   sodium chloride 0.9 % flush 10 mL (10 mL Intravenous Given 9/21/21 1222)   sodium chloride 0.9 % flush 10 mL (has no administration in " time range)   sennosides-docusate (PERICOLACE) 8.6-50 MG per tablet 2 tablet (2 tablets Oral Not Given 9/21/21 1217)     And   polyethylene glycol (MIRALAX) packet 17 g (has no administration in time range)     And   bisacodyl (DULCOLAX) EC tablet 5 mg (has no administration in time range)     And   bisacodyl (DULCOLAX) suppository 10 mg (has no administration in time range)   ondansetron (ZOFRAN) injection 4 mg (has no administration in time range)   multivitamin with minerals 1 tablet (1 tablet Oral Given 9/21/21 1219)   melatonin tablet 5 mg (has no administration in time range)   pantoprazole (PROTONIX) EC tablet 40 mg (40 mg Oral Given 9/21/21 1219)   nicotine (NICODERM CQ) 21 MG/24HR patch 1 patch (1 patch Transdermal Medication Applied 9/21/21 1220)   atorvastatin (LIPITOR) tablet 40 mg (has no administration in time range)   morphine injection 4 mg (4 mg Intravenous Given 9/21/21 1223)   aspirin EC tablet 81 mg (has no administration in time range)   nitroglycerin (NITROSTAT) ointment 0.5 inch (0.5 inches Topical Given 9/21/21 0827)   morphine injection 4 mg (4 mg Intravenous Given 9/21/21 0829)   aluminum-magnesium hydroxide-simethicone (MAALOX MAX) 400-400-40 MG/5ML suspension 15 mL (15 mL Oral Given 9/21/21 1221)   Lidocaine Viscous HCl (XYLOCAINE) 2 % mouth solution 15 mL (15 mL Mouth/Throat Given 9/21/21 1221)        Labs  Lab Results (last 24 hours)     Procedure Component Value Units Date/Time    POC Troponin I with Hold Tube [[id number]] Collected: 09/21/21 0745    Specimen: Blood Updated: 09/21/21 0929    Narrative:      The following orders were created for panel order POC Troponin I with Hold Tube.  Procedure                               Abnormality         Status                     ---------                               -----------         ------                     POC Troponin I[[id number]]                                                              HOLD Troponin-I Tube[[id number]]                              Final result                 Please view results for these tests on the individual orders.    CBC & Differential [[id number]]  (Abnormal) Collected: 09/21/21 0745    Specimen: Blood Updated: 09/21/21 0805    Narrative:      The following orders were created for panel order CBC & Differential.  Procedure                               Abnormality         Status                     ---------                               -----------         ------                     CBC Auto Differential[[id number]]        Abnormal            Final result                 Please view results for these tests on the individual orders.    Comprehensive Metabolic Panel [[id number]]  (Abnormal) Collected: 09/21/21 0745    Specimen: Blood Updated: 09/21/21 0932     Glucose 130 mg/dL      BUN 13 mg/dL      Creatinine 1.00 mg/dL      Sodium 137 mmol/L      Potassium 3.5 mmol/L      Comment: Slight hemolysis detected by analyzer. Results may be affected.        Chloride 101 mmol/L      CO2 26.3 mmol/L      Calcium 9.1 mg/dL      Total Protein 6.5 g/dL      Albumin 4.30 g/dL      ALT (SGPT) 25 U/L      AST (SGOT) 23 U/L      Alkaline Phosphatase 52 U/L      Total Bilirubin 0.4 mg/dL      eGFR Non African Amer 78 mL/min/1.73      Globulin 2.2 gm/dL      A/G Ratio 2.0 g/dL      BUN/Creatinine Ratio 13.0     Anion Gap 9.7 mmol/L     Narrative:      GFR Normal >60  Chronic Kidney Disease <60  Kidney Failure <15      Lipase [[id number]]  (Normal) Collected: 09/21/21 0745    Specimen: Blood Updated: 09/21/21 0828     Lipase 41 U/L     BNP [[id number]]  (Normal) Collected: 09/21/21 0745    Specimen: Blood Updated: 09/21/21 0822     proBNP 38.2 pg/mL     Narrative:      Among patients with dyspnea, NT-proBNP is highly sensitive for the detection of acute congestive heart failure. In addition NT-proBNP of <300 pg/ml effectively rules out acute congestive heart failure with 99% negative predictive value.    Results may be falsely decreased if  patient taking Biotin.      Magnesium [[id number]]  (Normal) Collected: 09/21/21 0745    Specimen: Blood Updated: 09/21/21 0828     Magnesium 1.9 mg/dL     CK Total & CKMB [[id number]]  (Abnormal) Collected: 09/21/21 0745    Specimen: Blood Updated: 09/21/21 0828     CKMB 2.90 ng/mL      Creatine Kinase 236 U/L     Narrative:      CKMB results may be falsely decreased if patient taking Biotin.    CBC Auto Differential [[id number]]  (Abnormal) Collected: 09/21/21 0745    Specimen: Blood Updated: 09/21/21 0805     WBC 14.06 10*3/mm3      RBC 4.96 10*6/mm3      Hemoglobin 16.1 g/dL      Hematocrit 45.4 %      MCV 91.5 fL      MCH 32.5 pg      MCHC 35.5 g/dL      RDW 13.4 %      RDW-SD 44.9 fl      MPV 9.4 fL      Platelets 291 10*3/mm3      Neutrophil % 49.6 %      Lymphocyte % 36.6 %      Monocyte % 8.5 %      Eosinophil % 4.0 %      Basophil % 0.9 %      Immature Grans % 0.4 %      Neutrophils, Absolute 6.98 10*3/mm3      Lymphocytes, Absolute 5.15 10*3/mm3      Monocytes, Absolute 1.19 10*3/mm3      Eosinophils, Absolute 0.56 10*3/mm3      Basophils, Absolute 0.12 10*3/mm3      Immature Grans, Absolute 0.06 10*3/mm3      nRBC 0.0 /100 WBC     POC Troponin I [[id number]]  (Normal) Collected: 09/21/21 0748    Specimen: Blood Updated: 09/21/21 0801     Troponin I 0.07 ng/mL      Comment: Serial Number: [id number]:  [id number]       POC Troponin I with Hold Tube [[id number]] Collected: 09/21/21 1006    Specimen: Blood Updated: 09/21/21 1023    Narrative:      The following orders were created for panel order POC Troponin I with Hold Tube.  Procedure                               Abnormality         Status                     ---------                               -----------         ------                     POC Troponin I[[id number]]                                                              HOLD Troponin-I Tube[[id number]]                             Final result                 Please view results for these tests on the  individual orders.    POC Troponin I [[id number]]  (Normal) Collected: 09/21/21 1006    Specimen: Blood Updated: 09/21/21 1019     Troponin I 0.24 ng/mL      Comment: Serial Number: [id number]:  [id number]              Imaging:  XR Chest 1 View    Result Date: 9/21/2021  PROCEDURE: XR CHEST 1 VW  COMPARISON: [facility], , CHEST AP/PA 1 VIEW, 6/06/2019, 17:33.  INDICATIONS: Chest Pain triage protocol  FINDINGS:  The cardiomediastinal silhouette is within normal limits.  Pulmonary vascularity is unremarkable.  There is no focal airspace consolidation, pleural effusion, or pneumothorax.  There are degenerative changes of the thoracic spine.  CONCLUSION:  1. No acute cardiopulmonary abnormality.        SHANNON NAZARIO MD       Electronically Signed and Approved By: SHANNON NAZARIO MD on 9/21/2021 at 8:04               EKG:    Rhythm: Sinus rhythm  Rate: 60  Intervals: Normal  T-wave: Normal  ST Segment: Normal    EKG Comparison: N/A    Interpreted by me    Procedures:  Procedures    Progress     HEART SCORE  History: Highly suspicious (2)  ECG: Normal (0)  Age: 45-64 years old (1)  Risk factors: >3 Risks or PMH of ASCVD (2)  Troponin: normal (0)    This patient's HEART score is 5.    According to the HEART Score Study: Score (Risk of adverse cardiac event in the next 14 days)  Scores 0-3: (0.9-1.7%) In the HEART Score study, these patients were discharged home.  Scores 4-6: (12-16.6%)  In the HEART Score study, these patients were admitted to the hospital.  Scores ?7: (50-65%) In the HEART Score study, these patients were candidates for early invasive measures.   Final disposition is based on individual provider judgement and current clinical situation.                         Medical Decision Making:  MDM  Number of Diagnoses or Management Options  Chest pain, unspecified type  Diagnosis management comments: The patient´s CBC was reviewed and shows no abnormalities of critical concern. Of note, there  is no anemia requiring a blood transfusion and the platelet count is acceptable.  The patient´s CMP was reviewed and shows no abnormalities of critical concern. Of note, the patient´s sodium and potassium are acceptable. The patient´s liver enzymes are unremarkable. The patient´s renal function (creatinine) is preserved. The patient has a normal anion gap.  Patient was given nitroglycerin.  He does report with easing of his chest pain with nitroglycerin.  Case was discussed with Dr. Dave who agrees with admission.       Amount and/or Complexity of Data Reviewed  Clinical lab tests: reviewed and ordered  Tests in the radiology section of CPT®: reviewed and ordered  Tests in the medicine section of CPT®: ordered and reviewed  Discussion of test results with the performing providers: yes  Discuss the patient with other providers: yes    Risk of Complications, Morbidity, and/or Mortality  Presenting problems: moderate  Management options: moderate    Patient Progress  Patient progress: stable       Final diagnoses:   Chest pain, unspecified type        Disposition:  ED Disposition     ED Disposition Condition Comment    Decision to Admit  Level of Care: Telemetry [5]   Diagnosis: Chest pain [315841]   Admitting Physician: GERMAN DAVE [[id number]]   Attending Physician: GERMAN DAVE [[id number]]            This medical record created using voice recognition software and may contain unintended errors.    Documentation assistance provided by Carolina Boudreaux acting as scribe for Cecily Abraham MD Information recorded by the scribe was done at my direction and has been verified and validated by me.          Carolina Boudreaux  09/21/21 0737       Carolina Boudreaux  09/21/21 0741       Cecily Abraham MD  09/21/21 9995

## 2021-09-21 NOTE — PLAN OF CARE
Goal Outcome Evaluation:  Plan of Care Reviewed With: patient, spouse        Progress: no change     Admit from ED for c/p. Echo done today. Stress test in the AM NPO after midnight. No c/p of chest pain currently. VSS. Continue to monitor.

## 2021-09-22 PROBLEM — I21.4 NSTEMI, INITIAL EPISODE OF CARE: Status: ACTIVE | Noted: 2021-09-21

## 2021-09-22 LAB
ACT BLD: 274 SECONDS (ref 89–137)
ACT BLD: 422 SECONDS (ref 89–137)
ALBUMIN SERPL-MCNC: 4.2 G/DL (ref 3.5–5.2)
ALBUMIN/GLOB SERPL: 1.8 G/DL
ALP SERPL-CCNC: 55 U/L (ref 39–117)
ALT SERPL W P-5'-P-CCNC: 27 U/L (ref 1–41)
ANION GAP SERPL CALCULATED.3IONS-SCNC: 8.3 MMOL/L (ref 5–15)
ANION GAP SERPL CALCULATED.3IONS-SCNC: 8.6 MMOL/L (ref 5–15)
AST SERPL-CCNC: 29 U/L (ref 1–40)
BASOPHILS # BLD AUTO: 0.14 10*3/MM3 (ref 0–0.2)
BASOPHILS NFR BLD AUTO: 1.2 % (ref 0–1.5)
BILIRUB SERPL-MCNC: 0.2 MG/DL (ref 0–1.2)
BUN SERPL-MCNC: 12 MG/DL (ref 6–20)
BUN SERPL-MCNC: 12 MG/DL (ref 6–20)
BUN/CREAT SERPL: 11.8 (ref 7–25)
BUN/CREAT SERPL: 12.2 (ref 7–25)
CALCIUM SPEC-SCNC: 8.9 MG/DL (ref 8.6–10.5)
CALCIUM SPEC-SCNC: 9.5 MG/DL (ref 8.6–10.5)
CHLORIDE SERPL-SCNC: 101 MMOL/L (ref 98–107)
CHLORIDE SERPL-SCNC: 103 MMOL/L (ref 98–107)
CO2 SERPL-SCNC: 28.4 MMOL/L (ref 22–29)
CO2 SERPL-SCNC: 28.7 MMOL/L (ref 22–29)
CREAT SERPL-MCNC: 0.98 MG/DL (ref 0.76–1.27)
CREAT SERPL-MCNC: 1.02 MG/DL (ref 0.76–1.27)
DEPRECATED RDW RBC AUTO: 45 FL (ref 37–54)
DEPRECATED RDW RBC AUTO: 45.9 FL (ref 37–54)
EOSINOPHIL # BLD AUTO: 0.67 10*3/MM3 (ref 0–0.4)
EOSINOPHIL NFR BLD AUTO: 5.6 % (ref 0.3–6.2)
ERYTHROCYTE [DISTWIDTH] IN BLOOD BY AUTOMATED COUNT: 13.2 % (ref 12.3–15.4)
ERYTHROCYTE [DISTWIDTH] IN BLOOD BY AUTOMATED COUNT: 13.5 % (ref 12.3–15.4)
GFR SERPL CREATININE-BSD FRML MDRD: 76 ML/MIN/1.73
GFR SERPL CREATININE-BSD FRML MDRD: 79 ML/MIN/1.73
GLOBULIN UR ELPH-MCNC: 2.3 GM/DL
GLUCOSE SERPL-MCNC: 103 MG/DL (ref 65–99)
GLUCOSE SERPL-MCNC: 113 MG/DL (ref 65–99)
HCT VFR BLD AUTO: 45.1 % (ref 37.5–51)
HCT VFR BLD AUTO: 46.7 % (ref 37.5–51)
HGB BLD-MCNC: 15.9 G/DL (ref 13–17.7)
HGB BLD-MCNC: 16 G/DL (ref 13–17.7)
IMM GRANULOCYTES # BLD AUTO: 0.04 10*3/MM3 (ref 0–0.05)
IMM GRANULOCYTES NFR BLD AUTO: 0.3 % (ref 0–0.5)
INR PPP: 1.07 (ref 2–3)
LYMPHOCYTES # BLD AUTO: 5.9 10*3/MM3 (ref 0.7–3.1)
LYMPHOCYTES NFR BLD AUTO: 49.7 % (ref 19.6–45.3)
MAGNESIUM SERPL-MCNC: 2.1 MG/DL (ref 1.6–2.6)
MCH RBC QN AUTO: 31.8 PG (ref 26.6–33)
MCH RBC QN AUTO: 32.5 PG (ref 26.6–33)
MCHC RBC AUTO-ENTMCNC: 34 G/DL (ref 31.5–35.7)
MCHC RBC AUTO-ENTMCNC: 35.5 G/DL (ref 31.5–35.7)
MCV RBC AUTO: 91.7 FL (ref 79–97)
MCV RBC AUTO: 93.4 FL (ref 79–97)
MONOCYTES # BLD AUTO: 0.93 10*3/MM3 (ref 0.1–0.9)
MONOCYTES NFR BLD AUTO: 7.8 % (ref 5–12)
NEUTROPHILS NFR BLD AUTO: 35.4 % (ref 42.7–76)
NEUTROPHILS NFR BLD AUTO: 4.18 10*3/MM3 (ref 1.7–7)
NRBC BLD AUTO-RTO: 0 /100 WBC (ref 0–0.2)
PHOSPHATE SERPL-MCNC: 3.6 MG/DL (ref 2.5–4.5)
PLATELET # BLD AUTO: 281 10*3/MM3 (ref 140–450)
PLATELET # BLD AUTO: 296 10*3/MM3 (ref 140–450)
PMV BLD AUTO: 9.7 FL (ref 6–12)
PMV BLD AUTO: 9.8 FL (ref 6–12)
POTASSIUM SERPL-SCNC: 3.9 MMOL/L (ref 3.5–5.2)
POTASSIUM SERPL-SCNC: 4 MMOL/L (ref 3.5–5.2)
PROT SERPL-MCNC: 6.5 G/DL (ref 6–8.5)
PROTHROMBIN TIME: 11.6 SECONDS (ref 9.4–12)
RBC # BLD AUTO: 4.92 10*6/MM3 (ref 4.14–5.8)
RBC # BLD AUTO: 5 10*6/MM3 (ref 4.14–5.8)
SARS-COV-2 N GENE RESP QL NAA+PROBE: NOT DETECTED
SODIUM SERPL-SCNC: 138 MMOL/L (ref 136–145)
SODIUM SERPL-SCNC: 140 MMOL/L (ref 136–145)
WBC # BLD AUTO: 10.19 10*3/MM3 (ref 3.4–10.8)
WBC # BLD AUTO: 11.86 10*3/MM3 (ref 3.4–10.8)

## 2021-09-22 PROCEDURE — 85610 PROTHROMBIN TIME: CPT | Performed by: INTERNAL MEDICINE

## 2021-09-22 PROCEDURE — C1725 CATH, TRANSLUMIN NON-LASER: HCPCS | Performed by: INTERNAL MEDICINE

## 2021-09-22 PROCEDURE — C1887 CATHETER, GUIDING: HCPCS | Performed by: INTERNAL MEDICINE

## 2021-09-22 PROCEDURE — B2111ZZ FLUOROSCOPY OF MULTIPLE CORONARY ARTERIES USING LOW OSMOLAR CONTRAST: ICD-10-PCS | Performed by: INTERNAL MEDICINE

## 2021-09-22 PROCEDURE — B2151ZZ FLUOROSCOPY OF LEFT HEART USING LOW OSMOLAR CONTRAST: ICD-10-PCS | Performed by: INTERNAL MEDICINE

## 2021-09-22 PROCEDURE — 80048 BASIC METABOLIC PNL TOTAL CA: CPT | Performed by: INTERNAL MEDICINE

## 2021-09-22 PROCEDURE — 87635 SARS-COV-2 COVID-19 AMP PRB: CPT | Performed by: INTERNAL MEDICINE

## 2021-09-22 PROCEDURE — 4A023N7 MEASUREMENT OF CARDIAC SAMPLING AND PRESSURE, LEFT HEART, PERCUTANEOUS APPROACH: ICD-10-PCS | Performed by: INTERNAL MEDICINE

## 2021-09-22 PROCEDURE — C9600 PERC DRUG-EL COR STENT SING: HCPCS | Performed by: INTERNAL MEDICINE

## 2021-09-22 PROCEDURE — G0378 HOSPITAL OBSERVATION PER HR: HCPCS

## 2021-09-22 PROCEDURE — 93571 IV DOP VEL&/PRESS C FLO 1ST: CPT | Performed by: INTERNAL MEDICINE

## 2021-09-22 PROCEDURE — C1894 INTRO/SHEATH, NON-LASER: HCPCS | Performed by: INTERNAL MEDICINE

## 2021-09-22 PROCEDURE — 80053 COMPREHEN METABOLIC PANEL: CPT | Performed by: INTERNAL MEDICINE

## 2021-09-22 PROCEDURE — C1874 STENT, COATED/COV W/DEL SYS: HCPCS | Performed by: INTERNAL MEDICINE

## 2021-09-22 PROCEDURE — C1769 GUIDE WIRE: HCPCS | Performed by: INTERNAL MEDICINE

## 2021-09-22 PROCEDURE — 25010000002 HEPARIN (PORCINE) PER 1000 UNITS: Performed by: INTERNAL MEDICINE

## 2021-09-22 PROCEDURE — 93458 L HRT ARTERY/VENTRICLE ANGIO: CPT | Performed by: INTERNAL MEDICINE

## 2021-09-22 PROCEDURE — 99152 MOD SED SAME PHYS/QHP 5/>YRS: CPT | Performed by: INTERNAL MEDICINE

## 2021-09-22 PROCEDURE — 027034Z DILATION OF CORONARY ARTERY, ONE ARTERY WITH DRUG-ELUTING INTRALUMINAL DEVICE, PERCUTANEOUS APPROACH: ICD-10-PCS | Performed by: INTERNAL MEDICINE

## 2021-09-22 PROCEDURE — 83735 ASSAY OF MAGNESIUM: CPT | Performed by: INTERNAL MEDICINE

## 2021-09-22 PROCEDURE — 93005 ELECTROCARDIOGRAM TRACING: CPT | Performed by: INTERNAL MEDICINE

## 2021-09-22 PROCEDURE — 0 IOPAMIDOL PER 1 ML: Performed by: INTERNAL MEDICINE

## 2021-09-22 PROCEDURE — 92928 PRQ TCAT PLMT NTRAC ST 1 LES: CPT | Performed by: INTERNAL MEDICINE

## 2021-09-22 PROCEDURE — 25010000002 MIDAZOLAM PER 1MG: Performed by: INTERNAL MEDICINE

## 2021-09-22 PROCEDURE — 85347 COAGULATION TIME ACTIVATED: CPT

## 2021-09-22 PROCEDURE — 99153 MOD SED SAME PHYS/QHP EA: CPT | Performed by: INTERNAL MEDICINE

## 2021-09-22 PROCEDURE — 25010000002 FENTANYL CITRATE (PF) 100 MCG/2ML SOLUTION: Performed by: INTERNAL MEDICINE

## 2021-09-22 PROCEDURE — 84100 ASSAY OF PHOSPHORUS: CPT | Performed by: INTERNAL MEDICINE

## 2021-09-22 PROCEDURE — 85027 COMPLETE CBC AUTOMATED: CPT | Performed by: INTERNAL MEDICINE

## 2021-09-22 PROCEDURE — 99233 SBSQ HOSP IP/OBS HIGH 50: CPT | Performed by: INTERNAL MEDICINE

## 2021-09-22 PROCEDURE — 85025 COMPLETE CBC W/AUTO DIFF WBC: CPT | Performed by: INTERNAL MEDICINE

## 2021-09-22 DEVICE — XIENCE SIERRA™ EVEROLIMUS ELUTING CORONARY STENT SYSTEM 2.25 MM X 15 MM / RAPID-EXCHANGE
Type: IMPLANTABLE DEVICE | Status: FUNCTIONAL
Brand: XIENCE SIERRA™

## 2021-09-22 RX ORDER — SODIUM CHLORIDE 0.9 % (FLUSH) 0.9 %
10 SYRINGE (ML) INJECTION AS NEEDED
Status: CANCELLED | OUTPATIENT
Start: 2021-09-22

## 2021-09-22 RX ORDER — MIDAZOLAM HYDROCHLORIDE 2 MG/2ML
INJECTION, SOLUTION INTRAMUSCULAR; INTRAVENOUS AS NEEDED
Status: DISCONTINUED | OUTPATIENT
Start: 2021-09-22 | End: 2021-09-22 | Stop reason: HOSPADM

## 2021-09-22 RX ORDER — ACETAMINOPHEN 325 MG/1
650 TABLET ORAL EVERY 4 HOURS PRN
Status: DISCONTINUED | OUTPATIENT
Start: 2021-09-22 | End: 2021-09-23 | Stop reason: HOSPADM

## 2021-09-22 RX ORDER — ASPIRIN 81 MG/1
81 TABLET, CHEWABLE ORAL DAILY
Status: DISCONTINUED | OUTPATIENT
Start: 2021-09-22 | End: 2021-09-23 | Stop reason: HOSPADM

## 2021-09-22 RX ORDER — HEPARIN SODIUM 1000 [USP'U]/ML
INJECTION, SOLUTION INTRAVENOUS; SUBCUTANEOUS AS NEEDED
Status: DISCONTINUED | OUTPATIENT
Start: 2021-09-22 | End: 2021-09-22 | Stop reason: HOSPADM

## 2021-09-22 RX ORDER — LIDOCAINE HYDROCHLORIDE 20 MG/ML
INJECTION, SOLUTION INFILTRATION; PERINEURAL AS NEEDED
Status: DISCONTINUED | OUTPATIENT
Start: 2021-09-22 | End: 2021-09-22 | Stop reason: HOSPADM

## 2021-09-22 RX ORDER — ONDANSETRON 4 MG/1
4 TABLET, FILM COATED ORAL EVERY 6 HOURS PRN
Status: DISCONTINUED | OUTPATIENT
Start: 2021-09-22 | End: 2021-09-23 | Stop reason: HOSPADM

## 2021-09-22 RX ORDER — VERAPAMIL HYDROCHLORIDE 2.5 MG/ML
INJECTION, SOLUTION INTRAVENOUS AS NEEDED
Status: DISCONTINUED | OUTPATIENT
Start: 2021-09-22 | End: 2021-09-22 | Stop reason: HOSPADM

## 2021-09-22 RX ORDER — FENTANYL CITRATE 50 UG/ML
INJECTION, SOLUTION INTRAMUSCULAR; INTRAVENOUS AS NEEDED
Status: DISCONTINUED | OUTPATIENT
Start: 2021-09-22 | End: 2021-09-22 | Stop reason: HOSPADM

## 2021-09-22 RX ORDER — NITROGLYCERIN 5 MG/ML
INJECTION, SOLUTION INTRAVENOUS AS NEEDED
Status: DISCONTINUED | OUTPATIENT
Start: 2021-09-22 | End: 2021-09-22 | Stop reason: HOSPADM

## 2021-09-22 RX ORDER — SODIUM CHLORIDE 0.9 % (FLUSH) 0.9 %
3 SYRINGE (ML) INJECTION EVERY 12 HOURS SCHEDULED
Status: CANCELLED | OUTPATIENT
Start: 2021-09-22

## 2021-09-22 RX ORDER — SODIUM CHLORIDE 9 MG/ML
100 INJECTION, SOLUTION INTRAVENOUS CONTINUOUS
Status: ACTIVE | OUTPATIENT
Start: 2021-09-22 | End: 2021-09-22

## 2021-09-22 RX ORDER — SODIUM CHLORIDE 9 MG/ML
1.5 INJECTION, SOLUTION INTRAVENOUS CONTINUOUS
Status: CANCELLED | OUTPATIENT
Start: 2021-09-22 | End: 2021-09-22

## 2021-09-22 RX ORDER — ASPIRIN 81 MG/1
TABLET, CHEWABLE ORAL AS NEEDED
Status: DISCONTINUED | OUTPATIENT
Start: 2021-09-22 | End: 2021-09-22 | Stop reason: HOSPADM

## 2021-09-22 RX ORDER — ONDANSETRON 2 MG/ML
4 INJECTION INTRAMUSCULAR; INTRAVENOUS EVERY 6 HOURS PRN
Status: DISCONTINUED | OUTPATIENT
Start: 2021-09-22 | End: 2021-09-23 | Stop reason: HOSPADM

## 2021-09-22 RX ORDER — GINSENG 100 MG
1 CAPSULE ORAL ONCE
Status: COMPLETED | OUTPATIENT
Start: 2021-09-22 | End: 2021-09-22

## 2021-09-22 RX ORDER — SODIUM CHLORIDE 9 MG/ML
INJECTION, SOLUTION INTRAVENOUS CONTINUOUS PRN
Status: COMPLETED | OUTPATIENT
Start: 2021-09-22 | End: 2021-09-22

## 2021-09-22 RX ADMIN — ASPIRIN 81 MG: 81 TABLET, COATED ORAL at 08:05

## 2021-09-22 RX ADMIN — TICAGRELOR 90 MG: 90 TABLET ORAL at 21:47

## 2021-09-22 RX ADMIN — NICOTINE 1 PATCH: 21 PATCH, EXTENDED RELEASE TRANSDERMAL at 11:12

## 2021-09-22 RX ADMIN — DOCUSATE SODIUM 50MG AND SENNOSIDES 8.6MG 2 TABLET: 8.6; 5 TABLET, FILM COATED ORAL at 11:10

## 2021-09-22 RX ADMIN — SODIUM CHLORIDE, PRESERVATIVE FREE 10 ML: 5 INJECTION INTRAVENOUS at 21:47

## 2021-09-22 RX ADMIN — Medication 1 APPLICATION: at 14:12

## 2021-09-22 RX ADMIN — SODIUM CHLORIDE 100 ML/HR: 9 INJECTION, SOLUTION INTRAVENOUS at 10:47

## 2021-09-22 RX ADMIN — Medication 5 MG: at 21:47

## 2021-09-22 RX ADMIN — MULTIPLE VITAMINS W/ MINERALS TAB 1 TABLET: TAB at 08:05

## 2021-09-22 RX ADMIN — METOPROLOL SUCCINATE 25 MG: 25 TABLET, EXTENDED RELEASE ORAL at 11:10

## 2021-09-22 RX ADMIN — SODIUM CHLORIDE, PRESERVATIVE FREE 10 ML: 5 INJECTION INTRAVENOUS at 08:06

## 2021-09-22 RX ADMIN — ACETAMINOPHEN 650 MG: 325 TABLET ORAL at 13:02

## 2021-09-22 NOTE — PROGRESS NOTES
[facility]   Hospitalist Progress Note       Patient Name: Reji Samuel  : 1965  MRN: [id number]  Primary Care Physician: Tan Londono MD  Date of admission: 2021  Today's Date: 2021  Room / Bed:   [facility]  Subjective   Chief Complaint:  CP    Summary:  Reji Samuel is a 55 y.o. male with a past medical history significant for hypertension, seasonal allergies, recently placed on doxycycline for epididymitis who presents with a several day history of chest pain, patient states that this first started as left arm pain and worsened to a pressure in his chest.  Both times he had severe pain he states was after eating dinner.  He did have some associated shortness of breath, and some palpitations.  He denied any diaphoresis.  Because of worsening symptoms he presented to the emergency department for further evaluation.  In the emergency department the patient underwent EKG which was negative for any ischemic changes, the patient underwent troponin x1 which was negative, the patient was pain-free after administration of nitroglycerin.  Because of his past medical history and character of symptoms the hospitalist service was asked admit for further management.    Interval Followup: 2021  • No CP since yesterday  • Trop elevation noted  • Taken to the cath lab earlier today (OM stented)  • Feels well after the cath  • No events on telemetry  • Wife at bedside  • Wants to go home      REVIEW OF SYSTEMS: All other systems reviewed and are negative.   • GEN: No fevers. No chills. No weight gain. No weight loss.     • HEENT:   No dysphagia/odynophagia. No visual disturbance.    • GI:    No N/V.  No abd pain. No diarrhea. No constipation.  No bloody/black tarry stools. No hematemesis.   • CV: + chest discomfort.  No palps. No lightheadedness. No syncope. No orthopnea/PND. No edema.   • RESP:    No cough. No wheeze.  No increased sputum. No hemoptysis. No MCPHERSON.  • :   No dysuria or suprapubic  discomfort. No frequency.No urgency. No hesitancy. No incontinence. No hematuria. No flank pain.    • MS:   No joint stiffness or arthralgias. No myalgias. No muscle weakness.    • SKIN:   No painful or pruritic rashes.  No skin discoloration.  • NEURO:  No focal numbness or weakness.  No headaches.  No ataxia. No slurred speech. No receptive/expressive aphasia.      • PSYCH:   No anxiety. No depression.  • ENDO:  No tremor, hair loss, heat or cold intolerance.  Objective   Temp:  [97.7 °F (36.5 °C)-98.2 °F (36.8 °C)] 98.1 °F (36.7 °C)  Heart Rate:  [55-75] 69  Resp:  [14-18] 14  BP: (110-136)/(72-99) 134/94  Flow (L/min):  [2] 2  PHYSICAL EXAM   • CON: WN. WD. NAD.   • EYES:  Sclera anicteric. EOMI. Normal conjunctiva.   • ENT:  Oropharyngeal mucosa without ulcers or thrush.    • NECK:  No thyromegaly. No stridor. Trachea midline.  • RESP:  CTA. No wheezes. No crackles.  No work of breathing or tachypnea.   • CV:  Rhythm regular. Rate WNL. No murmur noted.  No edema.  • GI:  Soft and nontender. Nondistended.  Bowel sounds present.   • EXT: Peripheral pulses intact.  No joint deformities or cyanosis.  • LYMPH:  No lymphedema noted.  No cervical lymphadenopathy.  • PSYCH:  Alert. Oriented. Normal affect and mood.  • NEURO:  CNII-XII grossly intact. No dysarthria or aphasia. No unilateral weakness or paresthesia.  • SKIN: No chronic venous stasis changes or varicosities.  No cellulitis    Results from last 7 days   Lab Units 09/22/21  0415 09/21/21  0745   WBC 10*3/mm3 11.86* 14.06*   HEMOGLOBIN g/dL 15.9 16.1   HEMATOCRIT % 46.7 45.4   PLATELETS 10*3/mm3 296 291     Results from last 7 days   Lab Units 09/22/21  0415 09/21/21  0745   SODIUM mmol/L 140 137   POTASSIUM mmol/L 4.0 3.5   CO2 mmol/L 28.7 26.3   CHLORIDE mmol/L 103 101   ANION GAP mmol/L 8.3 9.7   BUN mg/dL 12 13   CREATININE mg/dL 0.98 1.00   GLUCOSE mg/dL 113* 130*           RESULTS REVIEWED:  I have personally reviewed the results from the time of this  admission to 9/22/2021 14:16 EDT and agree with these findings:  [x]  Laboratory  []  Microbiology  [x]  Radiology  [x]  EKG/Telemetry   [x]  Cardiology/Vascular   []  Pathology  []  Old records  []  Other:  Assessment / Plan   Assessment:  • Chest pain  • NSTEMI  • CAD  • S/P stenting of OM 9/22/21  • Tobacco use  • HTN  • Dyslipidemia  • Epididymitis  • GERD     Plan:  • Cardiology consulted  • Cath with PCI to OM earlier today  • DAPT  • Statin  • B Blocker  • Habitrol patch  • PPI    Discussed plan with RN.  DVT prophylaxis:  Mechanical DVT prophylaxis orders are present.  CODE STATUS:      Code Status: CPR  Medical Interventions (Level of Support Prior to Arrest): Full       Electronically signed by RICHARD Paz, 09/22/21, 2:16 PM EDT.    Patient independently seen and evaluated, agree with assessment and plan, briefly patient is a 55-year-old male who presents to the hospital with chest pain, patient was found to have initially negative troponin however he did end up bumping these, patient underwent cardiac catheterization which was significant for an OM blockage which was stented.  Patient denies any further chest pain, he denies any palpitations at this time.    Exam:  General appearance: NAD, conversant   Eyes: anicteric sclerae, moist conjunctivae; no lid-lag; PERRLA  HENT: Atraumatic; oropharynx clear with moist mucous membranes and no mucosal ulcerations; normal hard and soft palate  Neck: Trachea midline; FROM, supple, no thyromegaly or lymphadenopathy  Lungs: CTA, with normal respiratory effort and no intercostal retractions  CV: Regular Rate and Rhythm, no Murmurs, Rubs, or Gallops   Abdomen: Soft, non-tender; no masses or Heptosplenomegally  Extremities: No peripheral edema or extremity lymphadenopathy  Skin: Normal temperature, turgor and texture; no rash, ulcers or subcutaneous nodules  Psych: Appropriate affect, alert and oriented to person, place and time  Neuro: CN II - XII intact no motor  deficits, no sensory defecits    Plan:  Continue dual antiplatelet therapy  Continue post cath care  Continue PPI, beta-blocker, statin  Continue nicotine patch  Continue patient's home doxycycline at discharge  Clinical course will dictate further management    Telemetry: Reviewed by me, sinus    Reviewed patients labs and imaging, and discussed with patient and nurse at bedside.      Electronically signed by Vidal Morillo MD, 09/22/21, 5:00 PM EDT.

## 2021-09-22 NOTE — PLAN OF CARE
Goal Outcome Evaluation:  Plan of Care Reviewed With: patient, spouse        Progress: improving     Post cardiac cath and stent placement this morning via right radial. TR band removed successfully. Site with bacitracin and bandaid applied. VSS. Continue to monitor pt.

## 2021-09-23 VITALS
WEIGHT: 222.22 LBS | DIASTOLIC BLOOD PRESSURE: 82 MMHG | HEART RATE: 108 BPM | BODY MASS INDEX: 29.45 KG/M2 | HEIGHT: 73 IN | RESPIRATION RATE: 20 BRPM | OXYGEN SATURATION: 94 % | SYSTOLIC BLOOD PRESSURE: 120 MMHG | TEMPERATURE: 98.8 F

## 2021-09-23 PROBLEM — I21.4 NSTEMI (NON-ST ELEVATED MYOCARDIAL INFARCTION) (HCC): Status: ACTIVE | Noted: 2021-09-23

## 2021-09-23 LAB
ANION GAP SERPL CALCULATED.3IONS-SCNC: 10 MMOL/L (ref 5–15)
BUN SERPL-MCNC: 11 MG/DL (ref 6–20)
BUN/CREAT SERPL: 11.3 (ref 7–25)
CALCIUM SPEC-SCNC: 9.3 MG/DL (ref 8.6–10.5)
CHLORIDE SERPL-SCNC: 105 MMOL/L (ref 98–107)
CO2 SERPL-SCNC: 28 MMOL/L (ref 22–29)
CREAT SERPL-MCNC: 0.97 MG/DL (ref 0.76–1.27)
DEPRECATED RDW RBC AUTO: 45.2 FL (ref 37–54)
ERYTHROCYTE [DISTWIDTH] IN BLOOD BY AUTOMATED COUNT: 13.2 % (ref 12.3–15.4)
GFR SERPL CREATININE-BSD FRML MDRD: 80 ML/MIN/1.73
GLUCOSE SERPL-MCNC: 93 MG/DL (ref 65–99)
HCT VFR BLD AUTO: 44.9 % (ref 37.5–51)
HGB BLD-MCNC: 15.4 G/DL (ref 13–17.7)
MCH RBC QN AUTO: 31.5 PG (ref 26.6–33)
MCHC RBC AUTO-ENTMCNC: 34.3 G/DL (ref 31.5–35.7)
MCV RBC AUTO: 91.8 FL (ref 79–97)
PLATELET # BLD AUTO: 303 10*3/MM3 (ref 140–450)
PMV BLD AUTO: 9.5 FL (ref 6–12)
POTASSIUM SERPL-SCNC: 4.2 MMOL/L (ref 3.5–5.2)
RBC # BLD AUTO: 4.89 10*6/MM3 (ref 4.14–5.8)
SODIUM SERPL-SCNC: 143 MMOL/L (ref 136–145)
WBC # BLD AUTO: 14.86 10*3/MM3 (ref 3.4–10.8)

## 2021-09-23 PROCEDURE — 80048 BASIC METABOLIC PNL TOTAL CA: CPT | Performed by: INTERNAL MEDICINE

## 2021-09-23 PROCEDURE — 99239 HOSP IP/OBS DSCHRG MGMT >30: CPT | Performed by: INTERNAL MEDICINE

## 2021-09-23 PROCEDURE — 85027 COMPLETE CBC AUTOMATED: CPT | Performed by: INTERNAL MEDICINE

## 2021-09-23 RX ORDER — NICOTINE 21 MG/24HR
1 PATCH, TRANSDERMAL 24 HOURS TRANSDERMAL EVERY 24 HOURS
Qty: 30 EACH | Refills: 0 | Status: SHIPPED | OUTPATIENT
Start: 2021-09-23 | End: 2022-03-31

## 2021-09-23 RX ORDER — METOPROLOL SUCCINATE 25 MG/1
25 TABLET, EXTENDED RELEASE ORAL
Qty: 30 TABLET | Refills: 1 | Status: SHIPPED | OUTPATIENT
Start: 2021-09-23

## 2021-09-23 RX ORDER — ATORVASTATIN CALCIUM 40 MG/1
40 TABLET, FILM COATED ORAL NIGHTLY
Qty: 30 TABLET | Refills: 1 | Status: SHIPPED | OUTPATIENT
Start: 2021-09-23 | End: 2021-09-30

## 2021-09-23 RX ORDER — ASPIRIN 81 MG/1
TABLET ORAL
Qty: 30 TABLET | Refills: 1 | Status: SHIPPED | OUTPATIENT
Start: 2021-09-23

## 2021-09-23 RX ORDER — LISINOPRIL 10 MG/1
10 TABLET ORAL DAILY
Qty: 30 TABLET | Refills: 0 | Status: SHIPPED | OUTPATIENT
Start: 2021-09-23 | End: 2021-09-23 | Stop reason: HOSPADM

## 2021-09-23 RX ADMIN — SODIUM CHLORIDE, PRESERVATIVE FREE 10 ML: 5 INJECTION INTRAVENOUS at 09:01

## 2021-09-23 RX ADMIN — MULTIPLE VITAMINS W/ MINERALS TAB 1 TABLET: TAB at 09:00

## 2021-09-23 RX ADMIN — TICAGRELOR 90 MG: 90 TABLET ORAL at 09:00

## 2021-09-23 RX ADMIN — METOPROLOL SUCCINATE 25 MG: 25 TABLET, EXTENDED RELEASE ORAL at 09:00

## 2021-09-23 RX ADMIN — PANTOPRAZOLE SODIUM 40 MG: 40 TABLET, DELAYED RELEASE ORAL at 06:14

## 2021-09-23 RX ADMIN — ASPIRIN 81 MG CHEWABLE TABLET 81 MG: 81 TABLET CHEWABLE at 09:00

## 2021-09-23 RX ADMIN — ATORVASTATIN CALCIUM 40 MG: 40 TABLET, FILM COATED ORAL at 00:11

## 2021-09-23 NOTE — PLAN OF CARE
Problem: Adult Inpatient Plan of Care  Goal: Plan of Care Review  Outcome: Met  Goal: Patient-Specific Goal (Individualized)  Outcome: Met  Goal: Absence of Hospital-Acquired Illness or Injury  Outcome: Met  Intervention: Identify and Manage Fall Risk  Goal: Optimal Comfort and Wellbeing  Outcome: Met  Goal: Readiness for Transition of Care  Outcome: Met   Goal Outcome Evaluation:  Plan of Care Reviewed With: patient        Progress: improving  Outcome Summary: Patient doing well, has had no chest pain. Ambulates in room with no assistance or issues. Ready to go home.

## 2021-09-23 NOTE — PLAN OF CARE
Goal Outcome Evaluation:  Plan of Care Reviewed With: patient   R radial site WNL, no chest pain, resting through night. Tiffanie Bueno RN      Progress: improving

## 2021-09-23 NOTE — DISCHARGE SUMMARY
[facility]  HOSPITALIST  DISCHARGE SUMMARY       Patient Name: Reji Samuel  : 1965  MRN: [id number]  Primary Care Physician: Tan Londono MD    Date of Admission: 2021  Date of Discharge: 2021    Discharge Diagnoses     Active Hospital Problems    Diagnosis POA   • **NSTEMI, initial episode of care (CMS/[facility]) [I21.4] Unknown   • NSTEMI (non-ST elevated myocardial infarction) (HCC) [I21.4] Yes   • Chest pain [R07.9] Yes   • Smoking [F17.200] Unknown   • Anxiety [F41.9] Unknown   • Essential hypertension [I10] Unknown      Resolved Hospital Problems   No resolved problems to display.     · Chest pain, resolved  · NSTEMI  · CAD  · S/P stenting of OM 21  · Tobacco use  · HTN  · Dyslipidemia  · Epididymitis  · GERD  Hospital Course   Hospital Course:  Reji Samuel is a pleasant 55 y.o. male with a past medical history significant for hypertension, seasonal allergies, recently placed on doxycycline for epididymitis who presented with a several day history of chest pain.  He had left arm pain and worsened to a pressure in his chest.  He presented to the emergency department for further evaluation.  In the emergency department the patient underwent EKG which was negative for any ischemic changes.  Follow-up troponins became abnormal.  He was admitted to the hospitalist service with cardiology consulted.  He was given aspirin, Brilinta and statin and taken to the Cath Lab 21.  See cath/intervention report below.  He had a drug-eluting stent successfully placed into his obtuse marginal branch, which had a 95% occlusion.  He did well after the procedure.  He was monitored overnight.  He maintained sinus rhythm.  He was placed on beta-blocker therapy.  His home lisinopril-HCT was held/stopped.  He had no recurrence of chest discomfort and felt well.  He was very eager to return home the following day.  He will be on aspirin and Brilinta for at least 1 year.  He will be on  metoprolol.  He will be on a atorvastatin.  A prescription of Habitrol patch also given.  He is to follow-up with PCP in 2 weeks.  He is to follow-up with cardiologist in 1-2 weeks.  He is to seek medical attention if symptoms return or new symptoms develop in the interim.    Discharge Follow Up / Recommendations (labs, diagnostics, meds, etc):   • As above  • Future Appointments   Date Time Provider Department Center   10/1/2021  8:30 AM [facility] [place]  1  [facility]   10/14/2021  9:45 AM Marquita Bynum, BRIELLE [facility]   •   Consultants     • Consults     Date and Time Order Name Status Description    9/21/2021 10:20 AM General MD Inpatient Consult      9/21/2021  9:11 AM Inpatient Cardiology Consult Completed     9/21/2021  8:59 AM Inpatient Hospitalist Consult Completed       •   On Day of Discharge   VS: Temp:  [98 °F (36.7 °C)-98.8 °F (37.1 °C)] 98.8 °F (37.1 °C)  Heart Rate:  [] 108  Resp:  [14-20] 20  BP: (115-149)/(77-99) 120/82  EXAM:  (refer to progress note from 9/23/2021)     Discharge Medications      New Medications      Instructions Start Date   atorvastatin 40 MG tablet  Commonly known as: LIPITOR   40 mg, Oral, Nightly      metoprolol succinate XL 25 MG 24 hr tablet  Commonly known as: TOPROL-XL   25 mg, Oral, Every 24 Hours Scheduled      nicotine 21 MG/24HR patch  Commonly known as: NICODERM CQ   1 patch, Transdermal, Every 24 Hours      ticagrelor 90 MG tablet tablet  Commonly known as: BRILINTA   You must continue Brilinta and aspirin for at least 1 year after your stent         Changes to Medications      Instructions Start Date   aspirin 81 MG EC tablet  What changed:   · how much to take  · how to take this  · when to take this  · additional instructions   You must continue daily aspirin and Brilinta for at least 1 year after your stent         Continue These Medications      Instructions Start Date   cyclobenzaprine 10 MG tablet  Commonly known as: FLEXERIL   10 mg, Oral, 2  Times Daily PRN      doxycycline 100 MG capsule  Commonly known as: VIBRAMYCIN   100 mg, Oral, 2 Times Daily      omeprazole 20 MG capsule  Commonly known as: priLOSEC   20 mg, Oral, Daily PRN      sildenafil 20 MG tablet  Commonly known as: REVATIO   1-5 tabs PRN sexual intercourse      ZyrTEC Allergy 10 MG tablet  Generic drug: cetirizine   10 mg, Oral, Daily         Stop These Medications    lisinopril-hydrochlorothiazide 20-25 MG per tablet  Commonly known as: PRINFLAVIOZESTORETIC          Procedures   Cardiac Catheterization/Vascular Study    Narrative  [facility]  CARDIAC CATHETERIZATION PROCEDURE REPORT    Patient: Reji Samuel  : 1965  MRN: [id number]  Procedure Date: 21    Referring Physician:  Emergency department    Interventional Cardiologist:  Bg Palacios MD    Indication:  1. Chest pain  2. Non-ST segment elevation MI    Clinical Presentation:  Patient is a 55-year-old male who presented with chest pain and had a rise in his troponins.    Procedure performed:  1. Right radial arterial sheath placement  2. Left Heart Catheterization  3. Selective coronary Angiography  4. Left Ventriculography  5. Successful percutaneous coronary intervention to first OM with a 2.25 x 15 mm Xience drug-eluting stent  6. IFR of the mid and distal RCA that were nonsignificant  7. Moderate conscious sedation  8. TR band device placement for hemostasis    Details of the procedure:  Informed consent was obtained with an explanation of the risks, benefits and alternatives of the procedure. The patient was brought to the Cardiac Catheterization Laboratory in a fasting state.  The patient was prepped and draped in a standard, sterile fashion. Moderate conscious sedation with Fentanyl and Versed was administered by the circulating nurse. Lidocaine 2% was used to anesthetize the right radial artery and a 5/6 Kiswahili glidesheath was placed via modified Seldinger technique.  TIG catheter was used to  selectively engage both coronary arteries.  Multiple scintigraphic views were obtained.  The TIG catheter was used to cross the aortic valve.  Left heart hemodynamics and left ventriculogram was obtained.        Findings:  1. Coronary Artery Anatomy:  Dominance: Right  Left Main: Approximately 20% distal stenosis.  Gives off circumflex and LAD.  Left Anterior Descending: Moderate sized vessel.  Wraps the apex.  There is approximately 40% proximal stenosis.  In the mid segment there is mild diffuse disease.  Left Circumflex Artery: Gives off a high OM that has 95% ostial stenosis.  Gives off a distal PL branch.  In the circumflex and PL branch there is mild diffuse disease.  Right Coronary Artery: Dominant for the posterior segment.  Proximal has mild diffuse disease less than or equal to 20%.  In the mid segment there is a 50 to 70% focal stenosis.  The mid to distal segment has a 60 to 70% focal stenosis.  Otherwise mild diffuse disease.    2. Hemodynamics:  The opening aortic pressure is 120/70 mmHg.  The left ventricular end-diastolic pressure is 12 mmHg.  There was no gradient across aortic valve on pullback      3. Left Ventriculogram:  Ejection Fraction: 55%  Wall Motion: Normal  Mitral Regurgitation: No significant    4. Percutaneous Intervention:  Location: OM1  Treatment: 2.25 x 15 mm Xience drug-eluting stent  Pre-stenosis: 95%  Post-stenosis: Near 0%  EMERITA Flow Pre: 3  EMERITA Flow Post: 3  Bifurcation: Yes  Severe Calcium: No  Dissection: No            Details of angioplasty:    Heparin was used for anticoagulation throughout the procedure with frequent checking of ACT.  A XB 3.5 guide catheter was used to selectively cannulate the left main coronary artery.  A run-through wire was used to selectively cannulate the OM 1 with mild manipulation but no major difficulty.  A 1.2 x 12 mm compliant balloon was used to predilate the stenosis and then a 2.25 x 15 mm Xience drug-eluting stent was deployed up to 14  evan.    Follow-up angiography revealed excellent results.  No dissections or wire perforations were noted.  The patient was given 325 mg ASA and 180 mg of Brilinta during the procedure.    A JR4 guide catheter was then used to selectively engage the RCA.  An Omni wire was used to selectively cannulate the RCA with mild manipulation but no major difficulty.  IFR was done on the mid lesion which came out to 0.96.  iFR was then done on the distal lesion that came out at 0.92.  Stenting was deferred at this time.    At the end of the procedure, the right radial arterial sheath was removed and a TR band was applied for hemostasis.  Adequate hemostasis was achieved.  Patient tolerated procedure well without any complications.  The results of the test were explained in detail to the patient.    Cumulative fluoroscopy time: 22.1 min    Cumulative air kerma: 3777 mGy    Total amount of contrast used: 236 ml of Isovue      Complications:  None.    Estimated Blood Loss:  Minimal.    Conclusions:  1. Significant 95% ostial OM1 stenosis that appears to be the culprit vessel status post 2.25 x 15 mm Xience drug-eluting stent  2. IFR of the mid and distal RCA stenosis that were 0.96 and 0.92 respectively.  3. Normal ejection fraction  4. Normal LVEDP    Recommendations:  3. Continue aspirin and Brilinta for 1 year if possible.  Told him he cannot miss any of these doses.  4. Continue statin and beta-blocker.    Bg Palacios MD    09/22/21  10:30 EDT    Imaging     Adult Transthoracic Echo Complete W/ Cont if Necessary Per Protocol    Result Date: 9/21/2021  · Calculated left ventricular EF = 64% Estimated left ventricular EF was in agreement with the calculated left ventricular EF. · Left ventricular diastolic function is consistent with (grade I) impaired relaxation. · No significant valvular pathology.      Cardiac Catheterization/Vascular Study    Result Date: 9/22/2021  [facility] CARDIAC CATHETERIZATION  PROCEDURE REPORT Patient: Reji Samuel : 1965 MRN: [id number] Procedure Date: 21 Referring Physician: Emergency department Interventional Cardiologist: Bg Palacios MD Indication: 1. Chest pain 2. Non-ST segment elevation MI Clinical Presentation: Patient is a 55-year-old male who presented with chest pain and had a rise in his troponins. Procedure performed: 1. Right radial arterial sheath placement 2. Left Heart Catheterization 3. Selective coronary Angiography 4. Left Ventriculography 5. Successful percutaneous coronary intervention to first OM with a 2.25 x 15 mm Xience drug-eluting stent 6. IFR of the mid and distal RCA that were nonsignificant 7. Moderate conscious sedation 8. TR band device placement for hemostasis Details of the procedure: Informed consent was obtained with an explanation of the risks, benefits and alternatives of the procedure. The patient was brought to the Cardiac Catheterization Laboratory in a fasting state.  The patient was prepped and draped in a standard, sterile fashion. Moderate conscious sedation with Fentanyl and Versed was administered by the circulating nurse. Lidocaine 2% was used to anesthetize the right radial artery and a 5/6 Malay glidesheath was placed via modified Seldinger technique. TIG catheter was used to selectively engage both coronary arteries.  Multiple scintigraphic views were obtained.  The TIG catheter was used to cross the aortic valve.  Left heart hemodynamics and left ventriculogram was obtained.  Findings: 1. Coronary Artery Anatomy: Dominance: Right Left Main: Approximately 20% distal stenosis.  Gives off circumflex and LAD. Left Anterior Descending: Moderate sized vessel.  Wraps the apex.  There is approximately 40% proximal stenosis.  In the mid segment there is mild diffuse disease. Left Circumflex Artery: Gives off a high OM that has 95% ostial stenosis.  Gives off a distal PL branch.  In the circumflex and PL branch there is  mild diffuse disease. Right Coronary Artery: Dominant for the posterior segment.  Proximal has mild diffuse disease less than or equal to 20%.  In the mid segment there is a 50 to 70% focal stenosis.  The mid to distal segment has a 60 to 70% focal stenosis.  Otherwise mild diffuse disease. 2. Hemodynamics:    The opening aortic pressure is 120/70 mmHg. The left ventricular end-diastolic pressure is 12 mmHg. There was no gradient across aortic valve on pullback  3. Left Ventriculogram: Ejection Fraction: 55% Wall Motion: Normal Mitral Regurgitation: No significant 4. Percutaneous Intervention: Location: OM1 Treatment: 2.25 x 15 mm Xience drug-eluting stent Pre-stenosis: 95% Post-stenosis: Near 0% EMERITA Flow Pre: 3 EMERITA Flow Post: 3 Bifurcation: Yes Severe Calcium: No Dissection: No Details of angioplasty: Heparin was used for anticoagulation throughout the procedure with frequent checking of ACT.  A XB 3.5 guide catheter was used to selectively cannulate the left main coronary artery.  A run-through wire was used to selectively cannulate the OM 1 with mild manipulation but no major difficulty.  A 1.2 x 12 mm compliant balloon was used to predilate the stenosis and then a 2.25 x 15 mm Xience drug-eluting stent was deployed up to 14 evan.    Follow-up angiography revealed excellent results.  No dissections or wire perforations were noted.  The patient was given 325 mg ASA and 180 mg of Brilinta during the procedure. A JR4 guide catheter was then used to selectively engage the RCA.  An Omni wire was used to selectively cannulate the RCA with mild manipulation but no major difficulty.  IFR was done on the mid lesion which came out to 0.96.  iFR was then done on the distal lesion that came out at 0.92.  Stenting was deferred at this time. At the end of the procedure, the right radial arterial sheath was removed and a TR band was applied for hemostasis.  Adequate hemostasis was achieved.  Patient tolerated procedure well  without any complications.  The results of the test were explained in detail to the patient. Cumulative fluoroscopy time: 22.1 min Cumulative air kerma: 3777 mGy Total amount of contrast used: 236 ml of Isovue Complications: None. Estimated Blood Loss: Minimal. Conclusions: 1. Significant 95% ostial OM1 stenosis that appears to be the culprit vessel status post 2.25 x 15 mm Xience drug-eluting stent 2. IFR of the mid and distal RCA stenosis that were 0.96 and 0.92 respectively. 3. Normal ejection fraction 4. Normal LVEDP Recommendations: 3. Continue aspirin and Brilinta for 1 year if possible.  Told him he cannot miss any of these doses. 4. Continue statin and beta-blocker. Bg Palacios MD 09/22/21 10:30 EDT    XR Chest 1 View    Result Date: 9/21/2021  PROCEDURE: XR CHEST 1 VW  COMPARISON: [facility], , CHEST AP/PA 1 VIEW, 6/06/2019, 17:33.  INDICATIONS: Chest Pain triage protocol  FINDINGS:  The cardiomediastinal silhouette is within normal limits.  Pulmonary vascularity is unremarkable.  There is no focal airspace consolidation, pleural effusion, or pneumothorax.  There are degenerative changes of the thoracic spine.  CONCLUSION:  1. No acute cardiopulmonary abnormality.        SHANNON NAZARIO MD       Electronically Signed and Approved By: SHANNON NAZARIO MD on 9/21/2021 at 8:04             Discharge Details   Hospital Diet:     Diet Order   Procedures   • NPO Diet     CODE STATUS:    Code Status and Medical Interventions:   Ordered at: 09/21/21 0917     Code Status:    CPR     Medical Interventions (Level of Support Prior to Arrest):    Full     Additional Instructions for the Follow-ups that You Need to Schedule     Discharge Follow-up with PCP   As directed       Currently Documented PCP:    Tan Londono MD    PCP Phone Number:    [phone]     Follow Up Details: 2 weeks         Discharge Follow-up with Specified Provider: [facility] - Dr. Herrera; 2 Weeks   As  directed      To: [facility] - Dr. Herrera    Follow Up: 2 Weeks             Discharge Disposition: Home or Self Care  Pertinent  Labs   LAB RESULTS:      Lab 09/23/21  0431 09/22/21  1448 09/22/21  0415 09/21/21  0745   WBC 14.86* 10.19 11.86* 14.06*   HEMOGLOBIN 15.4 16.0 15.9 16.1   HEMATOCRIT 44.9 45.1 46.7 45.4   PLATELETS 303 281 296 291   NEUTROS ABS  --   --  4.18 6.98   IMMATURE GRANS (ABS)  --   --  0.04 0.06*   LYMPHS ABS  --   --  5.90* 5.15*   MONOS ABS  --   --  0.93* 1.19*   EOS ABS  --   --  0.67* 0.56*   MCV 91.8 91.7 93.4 91.5   PROTIME  --  11.6  --   --          Lab 09/23/21  0431 09/22/21  1448 09/22/21  0415 09/21/21  0745   SODIUM 143 138 140 137   POTASSIUM 4.2 3.9 4.0 3.5   CHLORIDE 105 101 103 101   CO2 28.0 28.4 28.7 26.3   ANION GAP 10.0 8.6 8.3 9.7   BUN 11 12 12 13   CREATININE 0.97 1.02 0.98 1.00   GLUCOSE 93 103* 113* 130*   CALCIUM 9.3 9.5 8.9 9.1   MAGNESIUM  --   --  2.1 1.9   PHOSPHORUS  --   --  3.6  --          Lab 09/22/21 0415 09/21/21  0745   TOTAL PROTEIN 6.5 6.5   ALBUMIN 4.20 4.30   GLOBULIN 2.3 2.2   ALT (SGPT) 27 25   AST (SGOT) 29 23   BILIRUBIN 0.2 0.4   ALK PHOS 55 52   LIPASE  --  41         Lab 09/22/21  1448 09/21/21  2305 09/21/21  1822 09/21/21  0745   PROBNP  --   --   --  38.2   TROPONIN T  --  0.101* 0.084*  --    PROTIME 11.6  --   --   --    INR 1.07*  --   --   --                  Brief Urine Lab Results  (Last result in the past 365 days)      Color   Clarity   Blood   Leuk Est   Nitrite   Protein   CREAT   Urine HCG        09/14/21 1033 Yellow Clear Trace Negative Negative Negative             Microbiology Results (last 10 days)     Procedure Component Value - Date/Time    COVID PRE-OP / PRE-PROCEDURE SCREENING ORDER (NO ISOLATION) - Swab, Nasopharynx [[id number]]  (Normal) Collected: 09/22/21 0806    Lab Status: Final result Specimen: Swab from Nasopharynx Updated: 09/22/21 1232    Narrative:      The following orders were created for  panel order COVID PRE-OP / PRE-PROCEDURE SCREENING ORDER (NO ISOLATION) - Swab, Nasopharynx.  Procedure                               Abnormality         Status                     ---------                               -----------         ------                     COVID-19,CEPHEID/ILANA/BD...[[id number]]  Normal              Final result                 Please view results for these tests on the individual orders.    COVID-19,CEPHEID/ILANA/BDMAX,COR/CRISTIAN/PAD/AMY IN-HOUSE(OR EMERGENT/ADD-ON),NP SWAB IN TRANSPORT MEDIA 3-4 HR TAT, RT-PCR - Swab, Nasopharynx [[id number]]  (Normal) Collected: 09/22/21 0806    Lab Status: Final result Specimen: Swab from Nasopharynx Updated: 09/22/21 1232     COVID19 Not Detected    Narrative:      Fact sheet for providers: https://www.fda.gov/media/095729/download     Fact sheet for patients: https://www.fda.gov/media/526692/download  Presumptive Positive: additional testing may be indicated if it is necessary to differentiate between SARS-CoV-2 and other Sarbecovirus, for epidemiological purposes, or clinical management.        Labs Pending at Discharge:   Time spent on Discharge including face to face service: > 30 minutes  Electronically signed by RICHARD Paz, 09/23/21, 11:17 AM EDT.       Patient independently seen and evaluated, agree with assessment and plan, briefly patient is a 55-year-old male presents the hospital for chest pain rule out, patient ended up having elevated troponins and ultimately went to the Cath Lab.  Patient underwent stenting to an OM branch, his he was chest pain-free on day of discharge, he was instructed the importance of dual antiplatelet therapy, he voiced understanding.  He is to follow-up with cardiology as scheduled, he should follow-up with his PCP, he is instructed to DC smoking.  He is discharged home in stable condition.    Exam:  General appearance: NAD, conversant   Eyes: anicteric sclerae, moist conjunctivae; no lid-lag; PERRLA  HENT:  Atraumatic; oropharynx clear with moist mucous membranes and no mucosal ulcerations; normal hard and soft palate  Neck: Trachea midline; FROM, supple, no thyromegaly or lymphadenopathy  Lungs: CTA, with normal respiratory effort and no intercostal retractions  CV: Regular Rate and Rhythm, no Murmurs, Rubs, or Gallops   Abdomen: Soft, non-tender; no masses or Heptosplenomegally  Extremities: No peripheral edema or extremity lymphadenopathy  Skin: Normal temperature, turgor and texture; no rash, ulcers or subcutaneous nodules  Psych: Appropriate affect, alert and oriented to person, place and time  Neuro: CN II - XII intact no motor deficits, no sensory defecits      Time spent: Greater than 30 minutes      Electronically signed by Vidal Morillo MD, 09/23/21, 8:26 PM EDT.

## 2021-09-23 NOTE — PROGRESS NOTES
[facility]   Hospitalist Progress Note       Patient Name: Reji Samuel  : 1965  MRN: [id number]  Primary Care Physician: Tan Londono MD  Date of admission: 2021  Today's Date: 2021  Room / Bed:   [facility]  Subjective   Chief Complaint:  CP    Summary:  Reji Samuel is a 55 y.o. male with a past medical history significant for hypertension, seasonal allergies, recently placed on doxycycline for epididymitis who presents with a several day history of chest pain, patient states that this first started as left arm pain and worsened to a pressure in his chest.  Both times he had severe pain he states was after eating dinner.  He did have some associated shortness of breath, and some palpitations.  He denied any diaphoresis.  Because of worsening symptoms he presented to the emergency department for further evaluation.  In the emergency department the patient underwent EKG which was negative for any ischemic changes, the patient underwent troponin x1 which was negative, the patient was pain-free after administration of nitroglycerin.  Because of his past medical history and character of symptoms the hospitalist service was asked admit for further management.    Interval Followup: 2021  • No CP.  No overnight complaints.  Very eager to go home today.  Wife at bedside.  • S/P PCI with OM stented yesterday  • Feels well after the cath  • Sinus on monitor.  No events on telemetry        REVIEW OF SYSTEMS: All other systems reviewed and are negative.   • GEN: No fevers. No chills. No weight gain. No weight loss.     • HEENT:   No dysphagia/odynophagia. No visual disturbance.    • GI:    No N/V.  No abd pain. No diarrhea. No constipation.  No bloody/black tarry stools. No hematemesis.   • CV: + chest discomfort, resolved.  No palps. No lightheadedness. No syncope. No orthopnea/PND. No edema.   • RESP:    No cough. No wheeze.  No increased sputum. No hemoptysis. No MCPHERSON.  • :   No dysuria  or suprapubic discomfort. No frequency.No urgency. No hesitancy. No incontinence. No hematuria. No flank pain.    • MS:   No joint stiffness or arthralgias. No myalgias. No muscle weakness.    • SKIN:   No painful or pruritic rashes.  No skin discoloration.  • NEURO:  No focal numbness or weakness.  No headaches.  No ataxia. No slurred speech. No receptive/expressive aphasia.      • PSYCH:   No anxiety. No depression.  • ENDO:  No tremor, hair loss, heat or cold intolerance.  Objective   Temp:  [98 °F (36.7 °C)-98.8 °F (37.1 °C)] 98.8 °F (37.1 °C)  Heart Rate:  [] 108  Resp:  [14-20] 20  BP: (115-149)/(77-99) 120/82  PHYSICAL EXAM   • CON: WN. WD. NAD.   • EYES:  Sclera anicteric. EOMI. Normal conjunctiva.   • ENT:  Oropharyngeal mucosa without ulcers or thrush.    • NECK:  No thyromegaly. No stridor. Trachea midline.  • RESP:  CTA. No wheezes. No crackles.  No work of breathing or tachypnea.   • CV:  Rhythm regular. Rate WNL. No murmur noted.  No edema.  • GI:  Soft and nontender. Nondistended.  Bowel sounds present.   • EXT: Peripheral pulses intact.  No joint deformities or cyanosis.  • LYMPH:  No lymphedema noted.  No cervical lymphadenopathy.  • PSYCH:  Alert. Oriented. Normal affect and mood.  • NEURO:  CNII-XII grossly intact. No dysarthria or aphasia. No unilateral weakness or paresthesia.  • SKIN: No chronic venous stasis changes or varicosities.  No cellulitis    Results from last 7 days   Lab Units 09/23/21  0431 09/22/21  1448 09/22/21  0415 09/21/21  0745   WBC 10*3/mm3 14.86* 10.19 11.86* 14.06*   HEMOGLOBIN g/dL 15.4 16.0 15.9 16.1   HEMATOCRIT % 44.9 45.1 46.7 45.4   PLATELETS 10*3/mm3 303 281 296 291     Results from last 7 days   Lab Units 09/23/21  0431 09/22/21  1448 09/22/21  0415 09/21/21  0745   SODIUM mmol/L 143 138 140 137   POTASSIUM mmol/L 4.2 3.9 4.0 3.5   CO2 mmol/L 28.0 28.4 28.7 26.3   CHLORIDE mmol/L 105 101 103 101   ANION GAP mmol/L 10.0 8.6 8.3 9.7   BUN mg/dL 11 12 12 13    CREATININE mg/dL 0.97 1.02 0.98 1.00   GLUCOSE mg/dL 93 103* 113* 130*     Results from last 7 days   Lab Units 09/22/21  1448   INR  1.07*       RESULTS REVIEWED:  I have personally reviewed the results from the time of this admission to 9/23/2021 11:06 EDT and agree with these findings:  [x]  Laboratory  []  Microbiology  [x]  Radiology  [x]  EKG/Telemetry   [x]  Cardiology/Vascular   []  Pathology  []  Old records  []  Other:  Assessment / Plan   Assessment:  • Chest pain  • NSTEMI  • CAD  • S/P stenting of OM 9/22/21  • Tobacco use  • HTN  • Dyslipidemia  • Epididymitis  • GERD     Plan:  • Home today  • DAPT x at least 1 year   • Statin  • B Blocker  • Habitrol patch  • Cardiology clinic 1-2 weeks    Discussed plan with RN.  DVT prophylaxis:  Mechanical DVT prophylaxis orders are present.  CODE STATUS:      Code Status: CPR  Medical Interventions (Level of Support Prior to Arrest): Full

## 2021-09-23 NOTE — PAYOR COMM NOTE
"Gloria Lugo (55 y.o. Male)     Date of Birth Social Security Number Address Home Phone MRN    1965  [id number] [facility] [address] [phone] [id number]    Buddhist Marital Status          None        Admission Date Admission Type Admitting Provider Attending Provider Department, Room/Bed    9/21/21 Emergency Vidal Morillo MD  [facility] CARE UNIT, 204/1    Discharge Date Discharge Disposition Discharge Destination        9/23/2021 Home or Self Care              Attending Provider: (none)   Allergies: Benzocaine, Codeine    Isolation: None   Infection: None   Code Status: CPR    Ht: 185.4 cm (73\")   Wt: 101 kg (222 lb 3.6 oz)    Admission Cmt: None   Principal Problem: NSTEMI, initial episode of care (HCC) [I21.4] More...                 Active Insurance as of 9/21/2021     Primary Coverage     Payor Plan Insurance Group Employer/Plan Group    [employer]UE [employer] PPO [id number]     Payor Plan Address Payor Plan Phone Number Payor Plan Fax Number Effective Dates    [address] [phone]  10/1/2011 - None Entered    [place] [address]       Subscriber Name Subscriber Birth Date Member ID       GLORIA LUGO 1965 [id number]                 Emergency Contacts      (Rel.) Home Phone Work Phone Mobile Phone    GLORIA LUGO (Father) -- -- [phone]    Lawrence Lugo (Spouse) -- -- --        +++++++NEW ED ADMISSION  OBS 9/21 AND 9/22  INPT 9/23 ++++++++       Emergency Department Notes      Cecily Abraham MD at 09/21/21 0732          Time: 7:33 AM EDT  Arrived by: EMS  Chief Complaint: chest pain  History provided by: pt  History is limited by: N/A     History of Present Illness:    Gloria Lugo is a 55 y.o. male who presents to the emergency department today with complaints of chest pain over the past three days. Pt states he has had a couple episodes of this pain over the past few days but notes " "this morning was the worst one yet. He describes it as a \"low nagging muscle ache\" in nature that radiates to bilateral arms. Pt was given a NTG with relief. He complains of associated nausea. He follows with Dr. Bry MD, for his primary care needs. He denies SOB, diaphoresis, chills, abdominal pain, emesis, neck pain, jaw pain, cough, or any other pertinent sx or concerns.       History provided by:  Patient   used: No    Chest Pain  Pain location:  L chest  Pain quality: pressure    Pain radiates to:  L arm  Pain severity:  Moderate  Onset quality:  Sudden  Duration:  2 days  Timing:  Intermittent  Progression:  Worsening  Relieved by:  Nothing  Worsened by:  Nothing  Associated symptoms: nausea    Associated symptoms: no abdominal pain, no cough, no fever, no headache, no palpitations, no shortness of breath and no vomiting      Patient Care Team  Primary Care Provider: Tan Londono MD    Past Medical History:     Allergies   Allergen Reactions   • Benzocaine Hives   • Codeine Nausea And Vomiting     Past Medical History:   Diagnosis Date   • Acid reflux    • HTN (hypertension)    • STD (male)      Past Surgical History:   Procedure Laterality Date   • APPENDECTOMY     • COLONOSCOPY     • TONSILLECTOMY     • TUMOR REMOVAL       Family History   Problem Relation Age of Onset   • Hypertension Mother    • Hypertension Father    • Prostate cancer Paternal Uncle        Home Medications:  Prior to Admission medications    Medication Sig Start Date End Date Taking? Authorizing Provider   aspirin (aspirin) 81 MG EC tablet aspirin 81 mg oral tablet,delayed release (DR/EC) take 1 tablet (81 mg) by oral route once daily   Active    Provider, MD Enoc   cetirizine (ZyrTEC Allergy) 10 MG tablet Zyrtec 10 mg oral tablet take 1 tablet (10 mg) by oral route once daily   Active    Provider, MD Enoc   cyclobenzaprine (FLEXERIL) 10 MG tablet  6/8/21   Provider, MD Enoc   doxycycline " "(VIBRAMYCIN) 100 MG capsule Take 1 capsule by mouth 2 (Two) Times a Day for 21 days. 9/14/21 10/5/21  Marquita Bynum APRN   ibuprofen (ADVIL,MOTRIN) 800 MG tablet  7/13/21   Provider, MD Enoc   lisinopril-hydrochlorothiazide (PRINZIDE,ZESTORETIC) 20-25 MG per tablet  9/11/21   ProviderEnoc MD   omeprazole (priLOSEC) 20 MG capsule     ProviderEnoc MD   sildenafil (REVATIO) 20 MG tablet 1-5 tabs PRN sexual intercourse 9/14/21   Marquita Bynum APRN        Social History:   Social History     Tobacco Use   • Smoking status: Current Every Day Smoker     Packs/day: 1.00     Types: Cigarettes   • Smokeless tobacco: Never Used   Vaping Use   • Vaping Use: Never used   Substance Use Topics   • Alcohol use: Not Currently   • Drug use: Not Currently     Recent travel: no     Review of Systems:  Review of Systems   Constitutional: Negative for chills and fever.   HENT: Negative for congestion, rhinorrhea and sore throat.    Eyes: Negative for pain and visual disturbance.   Respiratory: Negative for apnea, cough, chest tightness and shortness of breath.    Cardiovascular: Positive for chest pain. Negative for palpitations.   Gastrointestinal: Positive for nausea. Negative for abdominal pain, diarrhea and vomiting.   Genitourinary: Negative for difficulty urinating and dysuria.   Musculoskeletal: Negative for joint swelling and myalgias.   Skin: Negative for color change.   Neurological: Negative for seizures and headaches.   Psychiatric/Behavioral: Negative.    All other systems reviewed and are negative.       Physical Exam:  /77 (BP Location: Right arm, Patient Position: Lying)   Pulse 61   Temp 97.9 °F (36.6 °C) (Oral)   Resp 18   Ht 185.4 cm (73\")   Wt 101 kg (222 lb 3.6 oz)   SpO2 98%   BMI 29.32 kg/m²     Physical Exam  Vitals and nursing note reviewed.   Constitutional:       General: He is not in acute distress.     Appearance: Normal appearance. He is not toxic-appearing. "   HENT:      Head: Normocephalic and atraumatic.      Jaw: There is normal jaw occlusion.   Eyes:      General: Lids are normal.      Extraocular Movements: Extraocular movements intact.      Conjunctiva/sclera: Conjunctivae normal.      Pupils: Pupils are equal, round, and reactive to light.   Cardiovascular:      Rate and Rhythm: Normal rate and regular rhythm.      Pulses: Normal pulses.      Heart sounds: Normal heart sounds.   Pulmonary:      Effort: Pulmonary effort is normal. No respiratory distress.      Breath sounds: Normal breath sounds. No wheezing or rhonchi.   Abdominal:      General: Abdomen is flat.      Palpations: Abdomen is soft.      Tenderness: There is no abdominal tenderness. There is no guarding or rebound.   Musculoskeletal:         General: Normal range of motion.      Cervical back: Normal range of motion and neck supple.      Right lower leg: No edema.      Left lower leg: No edema.   Skin:     General: Skin is warm and dry.   Neurological:      Mental Status: He is alert and oriented to person, place, and time. Mental status is at baseline.   Psychiatric:         Mood and Affect: Mood normal.                Medications in the Emergency Department:  Medications   sodium chloride 0.9 % flush 10 mL (has no administration in time range)   aspirin chewable tablet 324 mg (324 mg Oral Not Given 9/21/21 0749)   cyclobenzaprine (FLEXERIL) tablet 5 mg (has no administration in time range)   sodium chloride 0.9 % flush 10 mL (10 mL Intravenous Given 9/21/21 1222)   sodium chloride 0.9 % flush 10 mL (has no administration in time range)   sennosides-docusate (PERICOLACE) 8.6-50 MG per tablet 2 tablet (2 tablets Oral Not Given 9/21/21 1217)     And   polyethylene glycol (MIRALAX) packet 17 g (has no administration in time range)     And   bisacodyl (DULCOLAX) EC tablet 5 mg (has no administration in time range)     And   bisacodyl (DULCOLAX) suppository 10 mg (has no administration in time range)    ondansetron (ZOFRAN) injection 4 mg (has no administration in time range)   multivitamin with minerals 1 tablet (1 tablet Oral Given 9/21/21 1219)   melatonin tablet 5 mg (has no administration in time range)   pantoprazole (PROTONIX) EC tablet 40 mg (40 mg Oral Given 9/21/21 1219)   nicotine (NICODERM CQ) 21 MG/24HR patch 1 patch (1 patch Transdermal Medication Applied 9/21/21 1220)   atorvastatin (LIPITOR) tablet 40 mg (has no administration in time range)   morphine injection 4 mg (4 mg Intravenous Given 9/21/21 1223)   aspirin EC tablet 81 mg (has no administration in time range)   nitroglycerin (NITROSTAT) ointment 0.5 inch (0.5 inches Topical Given 9/21/21 0827)   morphine injection 4 mg (4 mg Intravenous Given 9/21/21 0829)   aluminum-magnesium hydroxide-simethicone (MAALOX MAX) 400-400-40 MG/5ML suspension 15 mL (15 mL Oral Given 9/21/21 1221)   Lidocaine Viscous HCl (XYLOCAINE) 2 % mouth solution 15 mL (15 mL Mouth/Throat Given 9/21/21 1221)        Labs  Lab Results (last 24 hours)     Procedure Component Value Units Date/Time    POC Troponin I with Hold Tube [[id number]] Collected: 09/21/21 0745    Specimen: Blood Updated: 09/21/21 0929    Narrative:      The following orders were created for panel order POC Troponin I with Hold Tube.  Procedure                               Abnormality         Status                     ---------                               -----------         ------                     POC Troponin I[[id number]]                                                              HOLD Troponin-I Tube[[id number]]                             Final result                 Please view results for these tests on the individual orders.    CBC & Differential [[id number]]  (Abnormal) Collected: 09/21/21 0745    Specimen: Blood Updated: 09/21/21 0805    Narrative:      The following orders were created for panel order CBC & Differential.  Procedure                               Abnormality         Status                      ---------                               -----------         ------                     CBC Auto Differential[[id number]]        Abnormal            Final result                 Please view results for these tests on the individual orders.    Comprehensive Metabolic Panel [[id number]]  (Abnormal) Collected: 09/21/21 0745    Specimen: Blood Updated: 09/21/21 0932     Glucose 130 mg/dL      BUN 13 mg/dL      Creatinine 1.00 mg/dL      Sodium 137 mmol/L      Potassium 3.5 mmol/L      Comment: Slight hemolysis detected by analyzer. Results may be affected.        Chloride 101 mmol/L      CO2 26.3 mmol/L      Calcium 9.1 mg/dL      Total Protein 6.5 g/dL      Albumin 4.30 g/dL      ALT (SGPT) 25 U/L      AST (SGOT) 23 U/L      Alkaline Phosphatase 52 U/L      Total Bilirubin 0.4 mg/dL      eGFR Non African Amer 78 mL/min/1.73      Globulin 2.2 gm/dL      A/G Ratio 2.0 g/dL      BUN/Creatinine Ratio 13.0     Anion Gap 9.7 mmol/L     Narrative:      GFR Normal >60  Chronic Kidney Disease <60  Kidney Failure <15      Lipase [[id number]]  (Normal) Collected: 09/21/21 0745    Specimen: Blood Updated: 09/21/21 0828     Lipase 41 U/L     BNP [[id number]]  (Normal) Collected: 09/21/21 0745    Specimen: Blood Updated: 09/21/21 0822     proBNP 38.2 pg/mL     Narrative:      Among patients with dyspnea, NT-proBNP is highly sensitive for the detection of acute congestive heart failure. In addition NT-proBNP of <300 pg/ml effectively rules out acute congestive heart failure with 99% negative predictive value.    Results may be falsely decreased if patient taking Biotin.      Magnesium [[id number]]  (Normal) Collected: 09/21/21 0745    Specimen: Blood Updated: 09/21/21 0828     Magnesium 1.9 mg/dL     CK Total & CKMB [[id number]]  (Abnormal) Collected: 09/21/21 0745    Specimen: Blood Updated: 09/21/21 0828     CKMB 2.90 ng/mL      Creatine Kinase 236 U/L     Narrative:      CKMB results may be falsely decreased if patient  taking Biotin.    CBC Auto Differential [[id number]]  (Abnormal) Collected: 09/21/21 0745    Specimen: Blood Updated: 09/21/21 0805     WBC 14.06 10*3/mm3      RBC 4.96 10*6/mm3      Hemoglobin 16.1 g/dL      Hematocrit 45.4 %      MCV 91.5 fL      MCH 32.5 pg      MCHC 35.5 g/dL      RDW 13.4 %      RDW-SD 44.9 fl      MPV 9.4 fL      Platelets 291 10*3/mm3      Neutrophil % 49.6 %      Lymphocyte % 36.6 %      Monocyte % 8.5 %      Eosinophil % 4.0 %      Basophil % 0.9 %      Immature Grans % 0.4 %      Neutrophils, Absolute 6.98 10*3/mm3      Lymphocytes, Absolute 5.15 10*3/mm3      Monocytes, Absolute 1.19 10*3/mm3      Eosinophils, Absolute 0.56 10*3/mm3      Basophils, Absolute 0.12 10*3/mm3      Immature Grans, Absolute 0.06 10*3/mm3      nRBC 0.0 /100 WBC     POC Troponin I [[id number]]  (Normal) Collected: 09/21/21 0748    Specimen: Blood Updated: 09/21/21 0801     Troponin I 0.07 ng/mL      Comment: Serial Number: [id number]:  [id number]       POC Troponin I with Hold Tube [[id number]] Collected: 09/21/21 1006    Specimen: Blood Updated: 09/21/21 1023    Narrative:      The following orders were created for panel order POC Troponin I with Hold Tube.  Procedure                               Abnormality         Status                     ---------                               -----------         ------                     POC Troponin I[[id number]]                                                              HOLD Troponin-I Tube[[id number]]                             Final result                 Please view results for these tests on the individual orders.    POC Troponin I [[id number]]  (Normal) Collected: 09/21/21 1006    Specimen: Blood Updated: 09/21/21 1019     Troponin I 0.24 ng/mL      Comment: Serial Number: [id number]:  [id number]              Imaging:  XR Chest 1 View    Result Date: 9/21/2021  PROCEDURE: XR CHEST 1 VW  COMPARISON: [facility], , CHEST AP/PA 1 VIEW, 6/06/2019, 17:33.   INDICATIONS: Chest Pain triage protocol  FINDINGS:  The cardiomediastinal silhouette is within normal limits.  Pulmonary vascularity is unremarkable.  There is no focal airspace consolidation, pleural effusion, or pneumothorax.  There are degenerative changes of the thoracic spine.  CONCLUSION:  1. No acute cardiopulmonary abnormality.        SHANNON NAZARIO MD       Electronically Signed and Approved By: SHANNON NAZARIO MD on 9/21/2021 at 8:04               EKG:    Rhythm: Sinus rhythm  Rate: 60  Intervals: Normal  T-wave: Normal  ST Segment: Normal    EKG Comparison: N/A    Interpreted by me    Procedures:  Procedures    Progress     HEART SCORE  History: Highly suspicious (2)  ECG: Normal (0)  Age: 45-64 years old (1)  Risk factors: >3 Risks or PMH of ASCVD (2)  Troponin: normal (0)    This patient's HEART score is 5.    According to the HEART Score Study: Score (Risk of adverse cardiac event in the next 14 days)  Scores 0-3: (0.9-1.7%) In the HEART Score study, these patients were discharged home.  Scores 4-6: (12-16.6%)  In the HEART Score study, these patients were admitted to the hospital.  Scores ?7: (50-65%) In the HEART Score study, these patients were candidates for early invasive measures.   Final disposition is based on individual provider judgement and current clinical situation.                         Medical Decision Making:  MDM  Number of Diagnoses or Management Options  Chest pain, unspecified type  Diagnosis management comments: The patient´s CBC was reviewed and shows no abnormalities of critical concern. Of note, there is no anemia requiring a blood transfusion and the platelet count is acceptable.  The patient´s CMP was reviewed and shows no abnormalities of critical concern. Of note, the patient´s sodium and potassium are acceptable. The patient´s liver enzymes are unremarkable. The patient´s renal function (creatinine) is preserved. The patient has a normal anion gap.  Patient was given  nitroglycerin.  He does report with easing of his chest pain with nitroglycerin.  Case was discussed with Dr. Dave who agrees with admission.       Amount and/or Complexity of Data Reviewed  Clinical lab tests: reviewed and ordered  Tests in the radiology section of CPT®: reviewed and ordered  Tests in the medicine section of CPT®: ordered and reviewed  Discussion of test results with the performing providers: yes  Discuss the patient with other providers: yes    Risk of Complications, Morbidity, and/or Mortality  Presenting problems: moderate  Management options: moderate    Patient Progress  Patient progress: stable       Final diagnoses:   Chest pain, unspecified type        Disposition:  ED Disposition     ED Disposition Condition Comment    Decision to Admit  Level of Care: Telemetry [5]   Diagnosis: Chest pain [783705]   Admitting Physician: GERMAN DAVE [[id number]]   Attending Physician: GERMAN DAVE [[id number]]            This medical record created using voice recognition software and may contain unintended errors.    Documentation assistance provided by Carolina Boudreaux acting as scribe for Cecily Abraham MD Information recorded by the scribe was done at my direction and has been verified and validated by me.          Carolina Boudreaux  21 0737       Carolina Boudreaux  21 0741       Cecily Abraham MD  21 1523      Electronically signed by Cecily Abraham MD at 21 1523            German Dave MD   Physician   Hospitalist   H&P       Signed   Date of Service:  21   Creation Time:  21            Signed        Expand AllCollapse All      Show:Clear all  [x]Manual[x]Template[]Copied    Added by:  [x]German Dave MD    []Nancy for details   [facility]  Date: 2021   Patient Name: Reji Samuel  : 1965  MRN: [id number]  Primary Care Physician:  Tan Londono MD  Date of admission:  9/21/2021        Subjective []Expand by Default     Subjective      Chief Complaint: Chest pain     HPI:  Reji Samuel is a 55 y.o. male with a past medical history significant for hypertension, seasonal allergies, recently placed on doxycycline for epididymitis who presents with a several day history of chest pain, patient states that this first started as left arm pain and worsened to a pressure in his chest.  Both times he had severe pain he states was after eating dinner.  He did have some associated shortness of breath, and some palpitations.  He denied any diaphoresis.  Because of worsening symptoms he presented to the emergency department for further evaluation.  In the emergency department the patient underwent EKG which was negative for any ischemic changes, the patient underwent troponin x1 which was negative, the patient was pain-free after administration of nitroglycerin.  Because of his past medical history and character of symptoms the hospitalist service was asked admit for further management.        Personal History      Past Medical History:  Medical History        Past Medical History:   Diagnosis Date   • Acid reflux     • HTN (hypertension)     • STD (male)           Past Surgical History:  Surgical History         Past Surgical History:   Procedure Laterality Date   • APPENDECTOMY       • COLONOSCOPY       • TONSILLECTOMY       • TUMOR REMOVAL             Family History:         Family History   Problem Relation Age of Onset   • Hypertension Mother     • Hypertension Father     • Prostate cancer Paternal Uncle        Social History:   Current everyday smoker, occasional alcohol use, denies illicit drug use     Home Medications:  aspirin, cetirizine, cyclobenzaprine, doxycycline, ibuprofen, lisinopril-hydrochlorothiazide, omeprazole, and sildenafil     Allergies:       Allergies   Allergen Reactions   • Benzocaine Unknown - Low Severity   • Codeine Unknown - Low Severity         Review of  Systems              10 point review of systems negative other than stated in HPI           Objective      Objective      Vitals:   Temp:  [98.2 °F (36.8 °C)] 98.2 °F (36.8 °C)  Heart Rate:  [61] 61  Resp:  [12] 12  BP: (122)/(82) 122/82  Flow (L/min):  [2] 2     Physical Exam                         Constitutional: Awake, alert, no acute distress              Eyes: Pupils equal, sclerae anicteric, no conjunctival injection              HENT: NCAT, mucous membranes moist              Neck: Supple, no thyromegaly, no lymphadenopathy, trachea midline              Respiratory: Clear to auscultation bilaterally, nonlabored respirations               Cardiovascular: RRR, no murmurs, rubs, or gallops              Gastrointestinal: Positive bowel sounds, soft, nontender, nondistended              Musculoskeletal: No bilateral ankle edema, no clubbing or cyanosis to extremities              Psychiatric: Appropriate affect, cooperative              Neurologic: Oriented x 3, strength symmetric in all extremities, Cranial Nerves grossly intact to confrontation, speech clear              Skin: No rashes      Result Review:  I have personally reviewed the results from the time of this admission to 9/21/2021 09:21 EDT and agree with these findings:  [x]?  Laboratory  [x]?  Microbiology  [x]?  Radiology  []?  EKG/Telemetry   []?  Cardiology/Vascular   []?  Pathology  []?  Old records  []?  Other:              Assessment/Plan      Assessment / Plan      Assessment:   Chest pain  GERD  Hypertension  Leukocytosis     Plan:  · Patient admitted to the hospital for further work-up and management of above  · Cardiology consulted, appreciate their recommendations  · Initial troponin negative, trend every 6 hours  · Repeat EKG now, initial EKG nonischemic  · Keep n.p.o. pending cardiology evaluation  · GI cocktail x1  · Per document reviewed, patient is on doxycycline, question possibility of pill esophagitis  · Start Protonix  · Start  atorvastatin  · Monitor blood pressure, resume home antihypertensives as needed  · Check echocardiogram  · Clinical course will dictate further management     DVT prophylaxis: SCDs  GI: Protonix  Diet: N.p.o. after midnight   telemetry: Reviewed by me, sinus rhythm     Reviewed patients labs and imaging, and discussed with patient and nurse at bedside, discussed with emergency room physician Dr. Purcell, discussed with cardiology     CODE STATUS:    Code Status: CPR  Medical Interventions (Level of Support Prior to Arrest): Full        Admission Status:  I believe this patient meets observation status.       Electronically signed by Vidal Morillo MD, 21, 9:21 AM EDT.                                 Routing History                Sudhir Herrera MD   Physician   Cardiology   Consults       Addendum   Date of Service:  21   Creation Time:  21         Consult Orders   Inpatient Cardiology Consult [[id number]] ordered by Vidal Morillo MD at 21 0911          Expand AllCollapse All      Show:Clear all  [x]Manual[x]Template[]Copied    Added by:  [x]Sudhir Herrera MD    []Hover for details  Serial troponin was rising consistent with NSTEMI. Will cancel stress test and proceed with C this AM. PT understands and is agreeable to proceed. Please keep him NPO.         [facility]   Cardiology Consult Note     Patient Name: Reji Samuel  : 1965  MRN: [id number]  Primary Care Physician:  Tan Londono MD  Referring Physician: No ref. provider found  Date of admission: 2021        Subjective []Expand by Default     Subjective      Reason for Consult/ Chief Complaint: Chest pain     HPI:  Reji Samuel is a 55 y.o. male with hypertension, active smoking and anxiety disorder, presented to hospital with chest discomfort.  He started having recurrent anterior chest discomfort on Friday.  His discomfort was intermittent.  It was associated with palpitations and  nausea.  He had no other associated symptoms.  His chest discomfort was relieved by sublingual nitroglycerin.  It was radiating to both arms.  It varied in duration.  He had similar chest discomfort about 2 years ago, however, it was milder in intensity.  At that time, it seems that he was hospitalized for ACS rule out but had no cardiac stress test.  He gets occasional chest discomfort related to anxiety attacks, however, the chest discomfort he has been experiencing since Friday was different in character.     Patient has no other complaints.  He has no fever, chills, cough, orthopnea, edema, presyncope or syncope.     Review of Systems              All systems were reviewed and negative except as mentioned in HPI     Personal History      Medical History        Past Medical History:   Diagnosis Date   • Acid reflux     • HTN (hypertension)     • STD (male)                       Family History: family history includes Hypertension in his father and mother; Prostate cancer in his paternal uncle. Otherwise pertinent FHx was reviewed and not pertinent to current issue.     Social History:  reports that he has been smoking cigarettes. He has been smoking about 1.00 pack per day. He has never used smokeless tobacco. He reports previous alcohol use. He reports previous drug use.     Home Medications:  aspirin, cetirizine, cyclobenzaprine, doxycycline, lisinopril-hydrochlorothiazide, omeprazole, and sildenafil     Allergies:       Allergies   Allergen Reactions   • Benzocaine Hives   • Codeine Nausea And Vomiting               Objective       Objective      Vitals:   Temp:  [97.9 °F (36.6 °C)-98.2 °F (36.8 °C)] 97.9 °F (36.6 °C)  Heart Rate:  [55-62] 61  Resp:  [8-18] 18  BP: (122-123)/(71-82) 123/77  Flow (L/min):  [2] 2        Physical Exam:              Constitutional: Awake, alert, No acute distress               Eyes: PERRLA, sclerae anicteric, no conjunctival injection              HENT: NCAT, mucous membranes  moist              Neck: Supple, no thyromegaly, no lymphadenopathy, trachea midline              Respiratory: Clear to auscultation bilaterally, nonlabored respirations               Cardiovascular: RRR, no murmurs, rubs, or gallops, palpable pedal pulses bilaterally              Gastrointestinal: Positive bowel sounds, soft, nontender, nondistended              Musculoskeletal: No bilateral ankle edema, no clubbing or cyanosis to extremities              Psychiatric: Appropriate affect, cooperative              Neurologic: Oriented x 3, strength symmetric in all extremities, Cranial Nerves grossly intact to confrontation, speech clear              Skin: No rashes            Result Review       Result Review:  I have personally reviewed the results from the time of this admission to 9/21/2021 15:48 EDT and agree with these findings:  [x]?  Laboratory  []?  Microbiology  [x]?  Radiology  [x]?  EKG/Telemetry   [x]?  Cardiology/Vascular   []?  Pathology  [x]?  Old records  []?  Other:  Most notable findings include:      CMP Results:        CMP         CMP 9/21/21    Glucose 130 (A)    BUN 13    Creatinine 1.00    eGFR Non African Am 78    Sodium 137    Potassium 3.5    Chloride 101    Calcium 9.1    Albumin 4.30    Total Bilirubin 0.4    Alkaline Phosphatase 52    AST (SGOT) 23    ALT (SGPT) 25    (A) Abnormal value          Comments are available for some flowsheets but are not being displayed.                 CBC Results        CBC         CBC 9/21/21    WBC 14.06 (A)    RBC 4.96    Hemoglobin 16.1    Hematocrit 45.4    MCV 91.5    MCH 32.5    MCHC 35.5    RDW 13.4    Platelets 291    (A) Abnormal value                    No results found for: TROPONINT              Assessment/Plan      Assessment / Plan         Active Hospital Problems:  Active Hospital Problems     Diagnosis     • Chest pain     • Smoking     • Anxiety     • Essential hypertension              Plan:   1-chest pain with typical and atypical  features: He is currently chest pain-free.  His exam is benign.  ECG was noted and shows sinus rhythm without ischemic ST-T changes.  First 2 troponins were unremarkable.  Continue serial troponin check.  Echo will be done to assess LVEF, wall motion and valvular function.  He will be scheduled for exercise nuclear stress test tomorrow morning.  Continue aspirin, statin sublingual nitroglycerin.  We will start p.o. metoprolol and will give him Lovenox 1 mg/kilogram subcu x1 dose.     2.  Palpitations: Etiology is uncertain.  Could be related to anxiety and excessive caffeine intake.  Continue cardiac monitoring.  He was advised to avoid caffeine.     3.  Active smoking: The importance of smoking cessation was explained to him.  He is interested in quitting.  Continue nicotine patch.     4.  Hypertension: Blood pressure stable.  He is normally on lisinopril.  Will switch him to metoprolol as above.     Thank you for the consultation.  We will continue to follow along with you.        Electronically signed by Sudhir Herrera MD, 09/21/21, 3:44 PM EDT.            Revision History          Aileen Coker RNA   Registered Nurse      Plan of Care   Signed   Date of Service:  09/21/21 1835   Creation Time:  09/21/21 1835            Signed             Show:Clear all  [x]Manual[x]Template[]Copied    Added by:  [x]Aileen Coker RNA    []Nancy for details  Goal Outcome Evaluation:  Plan of Care Reviewed With: patient, spouse  Progress: no change  Admit from ED for c/p. Echo done today. Stress test in the AM NPO after midnight. No c/p of chest pain currently. VSS. Continue to monitor.               Vidal Morillo MD   Physician   Hospitalist   Progress Notes       Signed   Date of Service:  09/22/21 1415   Creation Time:  09/22/21 1415            Signed             Show:Clear all  [x]Manual[x]Template[x]Copied    Added by:  [x]Tan Loya PA[x]Vidal Morillo MD    []Nancy for details   [facility] Hospitalist  Progress Note         Patient Name: Reji Samuel  : 1965  MRN: [id number]  Primary Care Physician: Tan Londono MD  Date of admission: 2021  Today's Date: 2021  Room / Bed:   [facility] [place]  Subjective   Chief Complaint:  CP     Summary:  Reji Samuel is a 55 y.o. male with a past medical history significant for hypertension, seasonal allergies, recently placed on doxycycline for epididymitis who presents with a several day history of chest pain, patient states that this first started as left arm pain and worsened to a pressure in his chest.  Both times he had severe pain he states was after eating dinner.  He did have some associated shortness of breath, and some palpitations.  He denied any diaphoresis.  Because of worsening symptoms he presented to the emergency department for further evaluation.  In the emergency department the patient underwent EKG which was negative for any ischemic changes, the patient underwent troponin x1 which was negative, the patient was pain-free after administration of nitroglycerin.  Because of his past medical history and character of symptoms the hospitalist service was asked admit for further management.     Interval Followup: 2021  · No CP since yesterday  · Trop elevation noted  · Taken to the cath lab earlier today (OM stented)  · Feels well after the cath  · No events on telemetry  · Wife at bedside  · Wants to go home        REVIEW OF SYSTEMS: All other systems reviewed and are negative.   · GEN:   No fevers. No chills. No weight gain. No weight loss.     · HEENT:           No dysphagia/odynophagia. No visual disturbance.    · GI:                   No N/V.  No abd pain. No diarrhea. No constipation.  No bloody/black tarry stools. No hematemesis.   · CV:      + chest discomfort.  No palps. No lightheadedness. No syncope. No orthopnea/PND. No edema.   · RESP:             No cough. No wheeze.  No increased sputum. No hemoptysis. No MCPHERSON.  · :      No  dysuria or suprapubic discomfort. No frequency.No urgency. No hesitancy. No incontinence. No hematuria. No flank pain.    · MS:      No joint stiffness or arthralgias. No myalgias. No muscle weakness.    · SKIN:   No painful or pruritic rashes.  No skin discoloration.  · NEURO:          No focal numbness or weakness.  No headaches.  No ataxia. No slurred speech. No receptive/expressive aphasia.      · PSYCH:           No anxiety. No depression.  · ENDO:             No tremor, hair loss, heat or cold intolerance.  Objective   Temp:  [97.7 °F (36.5 °C)-98.2 °F (36.8 °C)] 98.1 °F (36.7 °C)  Heart Rate:  [55-75] 69  Resp:  [14-18] 14  BP: (110-136)/(72-99) 134/94  Flow (L/min):  [2] 2  PHYSICAL EXAM        · CON:   WN. WD. NAD.   · EYES:  Sclera anicteric. EOMI. Normal conjunctiva.   · ENT:    Oropharyngeal mucosa without ulcers or thrush.    · NECK:             No thyromegaly. No stridor. Trachea midline.  · RESP:             CTA. No wheezes. No crackles.  No work of breathing or tachypnea.   · CV:      Rhythm regular. Rate WNL. No murmur noted.  No edema.  · GI:                   Soft and nontender. Nondistended.  Bowel sounds present.   · EXT:    Peripheral pulses intact.  No joint deformities or cyanosis.  · LYMPH:           No lymphedema noted.  No cervical lymphadenopathy.  · PSYCH:           Alert. Oriented. Normal affect and mood.  · NEURO:          CNII-XII grossly intact. No dysarthria or aphasia. No unilateral weakness or paresthesia.  · SKIN:   No chronic venous stasis changes or varicosities.  No cellulitis           Results from last 7 days   Lab Units 09/22/21  0415 09/21/21  0745   WBC 10*3/mm3 11.86* 14.06*   HEMOGLOBIN g/dL 15.9 16.1   HEMATOCRIT % 46.7 45.4   PLATELETS 10*3/mm3 296 291            Results from last 7 days   Lab Units 09/22/21  0415 09/21/21  0745   SODIUM mmol/L 140 137   POTASSIUM mmol/L 4.0 3.5   CO2 mmol/L 28.7 26.3   CHLORIDE mmol/L 103 101   ANION GAP mmol/L 8.3 9.7   BUN mg/dL 12  13   CREATININE mg/dL 0.98 1.00   GLUCOSE mg/dL 113* 130*             RESULTS REVIEWED:  I have personally reviewed the results from the time of this admission to 9/22/2021 14:16 EDT and agree with these findings:  [x]?  Laboratory  []?  Microbiology  [x]?  Radiology  [x]?  EKG/Telemetry   [x]?  Cardiology/Vascular   []?  Pathology  []?  Old records  []?  Other:  Assessment / Plan   Assessment:  · Chest pain  · NSTEMI  · CAD  · S/P stenting of OM 9/22/21  · Tobacco use  · HTN  · Dyslipidemia  · Epididymitis  · GERD     Plan:  · Cardiology consulted  · Cath with PCI to OM earlier today  · DAPT  · Statin  · B Blocker  · Habitrol patch  · PPI     Discussed plan with RN.  DVT prophylaxis:  Mechanical DVT prophylaxis orders are present.  CODE STATUS:      Code Status: CPR  Medical Interventions (Level of Support Prior to Arrest): Full         Electronically signed by RICHARD Paz, 09/22/21, 2:16 PM EDT.     Patient independently seen and evaluated, agree with assessment and plan, briefly patient is a 55-year-old male who presents to the hospital with chest pain, patient was found to have initially negative troponin however he did end up bumping these, patient underwent cardiac catheterization which was significant for an OM blockage which was stented.  Patient denies any further chest pain, he denies any palpitations at this time.     Exam:  General appearance: NAD, conversant   Eyes: anicteric sclerae, moist conjunctivae; no lid-lag; PERRLA  HENT: Atraumatic; oropharynx clear with moist mucous membranes and no mucosal ulcerations; normal hard and soft palate  Neck: Trachea midline; FROM, supple, no thyromegaly or lymphadenopathy  Lungs: CTA, with normal respiratory effort and no intercostal retractions  CV: Regular Rate and Rhythm, no Murmurs, Rubs, or Gallops   Abdomen: Soft, non-tender; no masses or Heptosplenomegally  Extremities: No peripheral edema or extremity lymphadenopathy  Skin: Normal temperature, turgor  and texture; no rash, ulcers or subcutaneous nodules  Psych: Appropriate affect, alert and oriented to person, place and time  Neuro: CN II - XII intact no motor deficits, no sensory defecits     Plan:  Continue dual antiplatelet therapy  Continue post cath care  Continue PPI, beta-blocker, statin  Continue nicotine patch  Continue patient's home doxycycline at discharge  Clinical course will dictate further management     Telemetry: Reviewed by me, sinus     Reviewed patients labs and imaging, and discussed with patient and nurse at bedside.       Electronically signed by Vidal Morillo MD, 09/22/21, 5:00 PM EDT.                     Revision History                            Aileen Coker RNA   Registered Nurse      Plan of Care   Signed   Date of Service:  09/22/21 1535   Creation Time:  09/22/21 1535            Signed             Show:Clear all  [x]Manual[x]Template[]Copied    Added by:  [x]Aileen Coker RNA    []Nancy for details  Goal Outcome Evaluation:  Plan of Care Reviewed With: patient, spouse  Progress: improving  Post cardiac cath and stent placement this morning via right radial. TR band removed successfully. Site with bacitracin and bandaid applied. VSS. Continue to monitor pt.               Tiffanie Bueno, RN   Registered Nurse      Plan of Care   Signed   Date of Service:  09/23/21 0512   Creation Time:  09/23/21 0512            Signed             Show:Clear all  [x]Manual[x]Template[]Copied    Added by:  [x]Tiffanie Bueno, RN    []Nancy for details  Goal Outcome Evaluation:  Plan of Care Reviewed With: patient   R radial site WNL, no chest pain, resting through night. Tiffanie Bueno RN   Progress: improving                Lynnette Zhang RN   Registered Nurse      Plan of Care   Signed   Date of Service:  09/23/21 1153   Creation Time:  09/23/21 1153            Signed             Show:Clear all  []Manual[x]Template[]Copied    Added by:  [x]Lynnette Zhang RN    []Nancy for details     Problem:  Adult Inpatient Plan of Care  Goal: Plan of Care Review  Outcome: Met  Goal: Patient-Specific Goal (Individualized)  Outcome: Met  Goal: Absence of Hospital-Acquired Illness or Injury  Outcome: Met  Intervention: Identify and Manage Fall Risk  Goal: Optimal Comfort and Wellbeing  Outcome: Met  Goal: Readiness for Transition of Care  Outcome: Met   Goal Outcome Evaluation:  Plan of Care Reviewed With: patient  Progress: improving  Outcome Summary: Patient doing well, has had no chest pain. Ambulates in room with no assistance or issues. Ready to go home.              CONTACT   [facility] UTILIZATION REVIEW    [facility]  [address] [place] [address]  TAX ID [id number]  NP  [id number]       [phone]   [phone]

## 2021-09-24 ENCOUNTER — READMISSION MANAGEMENT (OUTPATIENT)
Dept: CALL CENTER | Facility: HOSPITAL | Age: 56
End: 2021-09-24

## 2021-09-24 LAB — QT INTERVAL: 420 MS

## 2021-09-27 ENCOUNTER — READMISSION MANAGEMENT (OUTPATIENT)
Dept: CALL CENTER | Facility: HOSPITAL | Age: 56
End: 2021-09-27

## 2021-09-27 NOTE — OUTREACH NOTE
AMI Week 1 Survey      Responses   [facility] patient discharged from?  [facility]   Does the patient have one of the following disease processes/diagnoses(primary or secondary)?  Acute MI (STEMI,NSTEMI)   Week 1 attempt successful?  No   Unsuccessful attempts  Attempt 1          Ana Wilcox RN

## 2021-09-29 ENCOUNTER — READMISSION MANAGEMENT (OUTPATIENT)
Dept: CALL CENTER | Facility: HOSPITAL | Age: 56
End: 2021-09-29

## 2021-09-29 NOTE — OUTREACH NOTE
AMI Week 2 Survey      Responses   [facility] facility patient discharged from?  [facility]   Does the patient have one of the following disease processes/diagnoses(primary or secondary)?  Acute MI (STEMI,NSTEMI)   Discharge diagnosis  NSTEMI          Katja Martinez RN

## 2021-09-30 ENCOUNTER — OFFICE VISIT (OUTPATIENT)
Dept: CARDIOLOGY | Facility: CLINIC | Age: 56
End: 2021-09-30

## 2021-09-30 VITALS
HEIGHT: 73 IN | DIASTOLIC BLOOD PRESSURE: 84 MMHG | HEART RATE: 72 BPM | WEIGHT: 222 LBS | SYSTOLIC BLOOD PRESSURE: 128 MMHG | BODY MASS INDEX: 29.42 KG/M2

## 2021-09-30 DIAGNOSIS — I10 ESSENTIAL HYPERTENSION: ICD-10-CM

## 2021-09-30 DIAGNOSIS — I25.10 CORONARY ARTERY DISEASE INVOLVING NATIVE CORONARY ARTERY OF NATIVE HEART WITHOUT ANGINA PECTORIS: Primary | ICD-10-CM

## 2021-09-30 DIAGNOSIS — F17.200 SMOKING: ICD-10-CM

## 2021-09-30 DIAGNOSIS — E78.2 MIXED HYPERLIPIDEMIA: ICD-10-CM

## 2021-09-30 PROCEDURE — 99214 OFFICE O/P EST MOD 30 MIN: CPT | Performed by: INTERNAL MEDICINE

## 2021-09-30 RX ORDER — ATORVASTATIN CALCIUM 80 MG/1
80 TABLET, FILM COATED ORAL DAILY
Qty: 90 TABLET | Refills: 3 | Status: SHIPPED | OUTPATIENT
Start: 2021-09-30 | End: 2022-10-10

## 2021-09-30 NOTE — PROGRESS NOTES
Chief Complaint  No chief complaint on file.      History of Present Illness  Reji Samuel presents to [facility] CARDIOLOGY    This is a very pleasant 55-year-old gentleman presents to clinic to follow-up on recent hospitalization and to establish cardiology care.  He presented to the hospital with chest discomfort.  He was diagnosed with non-STEMI.  He underwent left heart catheterization which demonstrated obstructive OM1 stenosis; status post PCI using Xience Lisa 2.25 x 15 mm drug-eluting stent with good results.  Moderate nonobstructive stenosis of the mid and distal RCA was noted confirmed to be not hemodynamically significant by pressure wire.  LVEF was preserved.     Patient has been doing well since hospital discharge.  He denies recurrent chest discomfort.  He has no dyspnea, orthopnea, edema, palpitations, presyncope or syncope.  He is compliant with his medications including DAPT.        Past Medical History:   Diagnosis Date   • Acid reflux    • HTN (hypertension)    • STD (male)          Current Outpatient Medications:   •  aspirin (aspirin) 81 MG EC tablet, You must continue daily aspirin and Brilinta for at least 1 year after your stent, Disp: 30 tablet, Rfl: 1  •  atorvastatin (LIPITOR) 40 MG tablet, Take 1 tablet by mouth Every Night., Disp: 30 tablet, Rfl: 1  •  cetirizine (ZyrTEC Allergy) 10 MG tablet, Take 10 mg by mouth Daily., Disp: , Rfl:   •  cyclobenzaprine (FLEXERIL) 10 MG tablet, Take 10 mg by mouth 2 (Two) Times a Day As Needed for Muscle Spasms., Disp: , Rfl:   •  doxycycline (VIBRAMYCIN) 100 MG capsule, Take 1 capsule by mouth 2 (Two) Times a Day for 21 days., Disp: 42 capsule, Rfl: 0  •  metoprolol succinate XL (TOPROL-XL) 25 MG 24 hr tablet, Take 1 tablet by mouth Daily., Disp: 30 tablet, Rfl: 1  •  nicotine (NICODERM CQ) 21 MG/24HR patch, Place 1 patch on the skin as directed by provider Daily., Disp: 30 each, Rfl: 0  •  omeprazole (priLOSEC) 20 MG capsule,  Take 20 mg by mouth Daily As Needed., Disp: , Rfl:   •  sildenafil (REVATIO) 20 MG tablet, 1-5 tabs PRN sexual intercourse, Disp: 30 tablet, Rfl: 4  •  ticagrelor (BRILINTA) 90 MG tablet tablet, Take 1 tab two times daily  Indications: CV3065, GI5326, SB7190, WX1119, Disp: 28 tablet, Rfl: 0    There are no discontinued medications.  Allergies   Allergen Reactions   • Benzocaine Hives   • Codeine Nausea And Vomiting        Social History     Tobacco Use   • Smoking status: Current Every Day Smoker     Packs/day: 1.00     Types: Cigarettes   • Smokeless tobacco: Never Used   Vaping Use   • Vaping Use: Never used   Substance Use Topics   • Alcohol use: Not Currently   • Drug use: Not Currently       Family History   Problem Relation Age of Onset   • Hypertension Mother    • Hypertension Father    • Prostate cancer Paternal Uncle         Objective     There were no vitals taken for this visit.      Physical Exam  Constitutional:       General: Awake. Not in acute distress.     Appearance: Normal appearance.   Neck:      Vascular: No carotid bruit, hepatojugular reflux or JVD.   Cardiovascular:      Rate and Rhythm: Normal rate and regular rhythm.      Chest Wall: PMI is not displaced.      Heart sounds: Normal heart sounds, S1 normal and S2 normal. No murmur heard.   No friction rub. No gallop. No S3 or S4 sounds.    Pulmonary:      Effort: Pulmonary effort is normal.      Breath sounds: Normal breath sounds. No wheezing, rhonchi or rales.   Ext.      Right lower leg: No edema.      Left lower leg: No edema.   Skin:     General: Skin is warm and dry.      Coloration: Skin is not cyanotic.      Findings: No petechiae or rash.   Neurological:      Mental Status: Alert and oriented x 3  Psychiatric:         Behavior: Behavior is cooperative.       Result Review :     proBNP   Date Value Ref Range Status   09/21/2021 38.2 0.0 - 900.0 pg/mL Final     CMP    CMP 9/21/21 9/22/21 9/22/21 9/23/21     1935 1448    Glucose 130 (A)  113 (A) 103 (A) 93   BUN 13 12 12 11   Creatinine 1.00 0.98 1.02 0.97   eGFR Non African Am 78 79 76 80   Sodium 137 140 138 143   Potassium 3.5 4.0 3.9 4.2   Chloride 101 103 101 105   Calcium 9.1 8.9 9.5 9.3   Albumin 4.30 4.20     Total Bilirubin 0.4 0.2     Alkaline Phosphatase 52 55     AST (SGOT) 23 29     ALT (SGPT) 25 27     (A) Abnormal value       Comments are available for some flowsheets but are not being displayed.           CBC w/diff    CBC w/Diff 9/21/21 9/22/21 9/22/21 9/23/21     0415 1448    WBC 14.06 (A) 11.86 (A) 10.19 14.86 (A)   RBC 4.96 5.00 4.92 4.89   Hemoglobin 16.1 15.9 16.0 15.4   Hematocrit 45.4 46.7 45.1 44.9   MCV 91.5 93.4 91.7 91.8   MCH 32.5 31.8 32.5 31.5   MCHC 35.5 34.0 35.5 34.3   RDW 13.4 13.5 13.2 13.2   Platelets 291 296 281 303   Neutrophil Rel % 49.6 35.4 (A)     Immature Granulocyte Rel % 0.4 0.3     Lymphocyte Rel % 36.6 49.7 (A)     Monocyte Rel % 8.5 7.8     Eosinophil Rel % 4.0 5.6     Basophil Rel % 0.9 1.2     (A) Abnormal value             Lab Results   Component Value Date    TSH 1.380 06/06/2019      Lab Results   Component Value Date    FREET4 1.0 06/06/2019      No results found for: DDIMERQUANT  Magnesium   Date Value Ref Range Status   09/22/2021 2.1 1.6 - 2.6 mg/dL Final      No results found for: DIGOXIN   Lab Results   Component Value Date    TROPONINT 0.101 (C) 09/21/2021      Lab Results   Component Value Date    POCTROP 0.24 09/21/2021   (         Results for orders placed during the hospital encounter of 09/21/21    Adult Transthoracic Echo Complete W/ Cont if Necessary Per Protocol    Interpretation Summary  · Calculated left ventricular EF = 64% Estimated left ventricular EF was in agreement with the calculated left ventricular EF.  · Left ventricular diastolic function is consistent with (grade I) impaired relaxation.  · No significant valvular pathology.         Results for orders placed during the hospital encounter of 09/21/21    Adult  Transthoracic Echo Complete W/ Cont if Necessary Per Protocol    Interpretation Summary  · Calculated left ventricular EF = 64% Estimated left ventricular EF was in agreement with the calculated left ventricular EF.  · Left ventricular diastolic function is consistent with (grade I) impaired relaxation.  · No significant valvular pathology.     Results for orders placed during the hospital encounter of 21    Cardiac Catheterization/Vascular Study    Narrative  [facility]  CARDIAC CATHETERIZATION PROCEDURE REPORT    Patient: Reji Samuel  : 1965  MRN: [id number]  Procedure Date: 21    Referring Physician:  Emergency department    Interventional Cardiologist:  Bg Palacios MD    Indication:  1. Chest pain  2. Non-ST segment elevation MI    Clinical Presentation:  Patient is a 55-year-old male who presented with chest pain and had a rise in his troponins.    Procedure performed:  1. Right radial arterial sheath placement  2. Left Heart Catheterization  3. Selective coronary Angiography  4. Left Ventriculography  5. Successful percutaneous coronary intervention to first OM with a 2.25 x 15 mm Xience drug-eluting stent  6. IFR of the mid and distal RCA that were nonsignificant  7. Moderate conscious sedation  8. TR band device placement for hemostasis    Details of the procedure:  Informed consent was obtained with an explanation of the risks, benefits and alternatives of the procedure. The patient was brought to the Cardiac Catheterization Laboratory in a fasting state.  The patient was prepped and draped in a standard, sterile fashion. Moderate conscious sedation with Fentanyl and Versed was administered by the circulating nurse. Lidocaine 2% was used to anesthetize the right radial artery and a 5/6 Greenlandic glidesheath was placed via modified Seldinger technique.  TIG catheter was used to selectively engage both coronary arteries.  Multiple scintigraphic views were obtained.  The  TIG catheter was used to cross the aortic valve.  Left heart hemodynamics and left ventriculogram was obtained.        Findings:  1. Coronary Artery Anatomy:  Dominance: Right  Left Main: Approximately 20% distal stenosis.  Gives off circumflex and LAD.  Left Anterior Descending: Moderate sized vessel.  Wraps the apex.  There is approximately 40% proximal stenosis.  In the mid segment there is mild diffuse disease.  Left Circumflex Artery: Gives off a high OM that has 95% ostial stenosis.  Gives off a distal PL branch.  In the circumflex and PL branch there is mild diffuse disease.  Right Coronary Artery: Dominant for the posterior segment.  Proximal has mild diffuse disease less than or equal to 20%.  In the mid segment there is a 50 to 70% focal stenosis.  The mid to distal segment has a 60 to 70% focal stenosis.  Otherwise mild diffuse disease.    2. Hemodynamics:  The opening aortic pressure is 120/70 mmHg.  The left ventricular end-diastolic pressure is 12 mmHg.  There was no gradient across aortic valve on pullback      3. Left Ventriculogram:  Ejection Fraction: 55%  Wall Motion: Normal  Mitral Regurgitation: No significant    4. Percutaneous Intervention:  Location: OM1  Treatment: 2.25 x 15 mm Xience drug-eluting stent  Pre-stenosis: 95%  Post-stenosis: Near 0%  EMERITA Flow Pre: 3  EMERITA Flow Post: 3  Bifurcation: Yes  Severe Calcium: No  Dissection: No            Details of angioplasty:    Heparin was used for anticoagulation throughout the procedure with frequent checking of ACT.  A XB 3.5 guide catheter was used to selectively cannulate the left main coronary artery.  A run-through wire was used to selectively cannulate the OM 1 with mild manipulation but no major difficulty.  A 1.2 x 12 mm compliant balloon was used to predilate the stenosis and then a 2.25 x 15 mm Xience drug-eluting stent was deployed up to 14 evan.    Follow-up angiography revealed excellent results.  No dissections or wire perforations  were noted.  The patient was given 325 mg ASA and 180 mg of Brilinta during the procedure.    A JR4 guide catheter was then used to selectively engage the RCA.  An Omni wire was used to selectively cannulate the RCA with mild manipulation but no major difficulty.  IFR was done on the mid lesion which came out to 0.96.  iFR was then done on the distal lesion that came out at 0.92.  Stenting was deferred at this time.    At the end of the procedure, the right radial arterial sheath was removed and a TR band was applied for hemostasis.  Adequate hemostasis was achieved.  Patient tolerated procedure well without any complications.  The results of the test were explained in detail to the patient.    Cumulative fluoroscopy time: 22.1 min    Cumulative air kerma: 3777 mGy    Total amount of contrast used: 236 ml of Isovue      Complications:  None.    Estimated Blood Loss:  Minimal.    Conclusions:  1. Significant 95% ostial OM1 stenosis that appears to be the culprit vessel status post 2.25 x 15 mm Xience drug-eluting stent  2. IFR of the mid and distal RCA stenosis that were 0.96 and 0.92 respectively.  3. Normal ejection fraction  4. Normal LVEDP    Recommendations:  3. Continue aspirin and Brilinta for 1 year if possible.  Told him he cannot miss any of these doses.  4. Continue statin and beta-blocker.    Bg Palacios MD    09/22/21  10:30 EDT                Diagnoses and all orders for this visit:    1. Coronary artery disease involving native coronary artery of native heart without angina pectoris (Primary)    2. Smoking    3. Essential hypertension    4. Mixed hyperlipidemia      Assessment and plan:    -Recent non-STEMI: Left heart catheterization showed severe ostial/proximal OM1 stenosis; status post PCI using Xience Lisa 2.25 x 15 mm drug-eluting stent with good results.  Mid and distal RCA had moderate nonobstructive disease confirmed by pressure wire.  LVEF was preserved.  He is doing well.  Continue  dual antiplatelet for at least 1 year from the time of his stent placement followed by aspirin for life.  Continue high intensity statin.    -Mixed dyslipidemia: Atorvastatin dose will be increased to 80 mg daily.    -Smoking: Importance of complete smoking cessation was explained to him at length.  He is planning on remaining quit.      Patient will be seen back in 6 months or sooner if needed    Follow Up       No follow-ups on file.    Patient was given instructions and counseling regarding his condition or for health maintenance advice. Please see specific information pulled into the AVS if appropriate.

## 2021-10-01 ENCOUNTER — READMISSION MANAGEMENT (OUTPATIENT)
Dept: CALL CENTER | Facility: HOSPITAL | Age: 56
End: 2021-10-01

## 2021-10-01 ENCOUNTER — HOSPITAL ENCOUNTER (OUTPATIENT)
Dept: ULTRASOUND IMAGING | Facility: HOSPITAL | Age: 56
Discharge: HOME | End: 2021-10-01
Admitting: NURSE PRACTITIONER

## 2021-10-01 DIAGNOSIS — N50.811 PAIN IN RIGHT TESTICLE: ICD-10-CM

## 2021-10-01 PROCEDURE — 76870 US EXAM SCROTUM: CPT

## 2021-10-01 NOTE — OUTREACH NOTE
AMI Week 1 Survey      Responses   [facility] [place] patient discharged from?  [facility]   Does the patient have one of the following disease processes/diagnoses(primary or secondary)?  Acute MI (STEMI,NSTEMI)   Week 1 attempt successful?  No   Unsuccessful attempts  Attempt 3          Petty Hood RN

## 2021-10-11 ENCOUNTER — READMISSION MANAGEMENT (OUTPATIENT)
Dept: CALL CENTER | Facility: HOSPITAL | Age: 56
End: 2021-10-11

## 2021-10-11 NOTE — OUTREACH NOTE
AMI Week 2 Survey      Responses   [facility] patient discharged from? [facility]   Does the patient have one of the following disease processes/diagnoses(primary or secondary)? Acute MI (STEMI,NSTEMI)   Week 2 attempt successful? Yes   Call start time 1627   Call end time 1640   Meds reviewed with patient/caregiver? Yes   Is the patient having any side effects they believe may be caused by any medication additions or changes? No   Does the patient have all prescriptions related to this admission filled (includes statins,anticoagulants,HTN meds,anti-arrhythmia meds) Yes   Is the patient taking all medications as directed (includes completed medication regime)? Yes   Comments regarding appointments Pt has follow up appointment.   Does the patient have a primary care provider?  Yes   Has the patient kept scheduled appointments due by today? N/A   Has home health visited the patient within 72 hours of discharge? N/A   Psychosocial issues? No   Did the patient receive a copy of their discharge instructions? Yes   What is the patient's perception of their health status since discharge? Improving   Nursing interventions Nurse provided patient education   Is the patient/caregiver able to teach back signs and symptoms of when to call for help immediately: Sudden chest discomfort,  Sudden discomfort in arms, back, neck or jaw,  Shortness of breath at any time,  Sudden sweating or clammy skin,  Nausea or vomiting,  Dizziness or lightheadedness,  Irregular or rapid heart rate   Nursing interventions Nurse provided patient education   Is the pateint /caregiver able to teach back the importance of cardiac rehab? Yes   Nursing interventions Provided education on importance of cardiac rehab   Is the patient/caregiver able to teach back lifestyle changes to help prevent MIs Quit smoking,  Regular exercise as approved by provider,  Heart healthy diet,  Maintaining a healthy weight,  Managing diabetes,  Reducing stress,  Limiting  alcohol intake   Is the patient/caregiver able to teach back ways to prevent a second heart attack: Take medications,  Follow up with MD,  Participate in Cardiac Rehab,  Manage risk factors,  Get support (AHA website:supportnetwork.heart.org)   Is the patient/caregiver able to teach back the hierarchy of who to call/visit for symptoms/problems? PCP, Specialist, Home health nurse, Urgent Care, ED, 911 Yes   Week 2 call completed? Yes   Wrap up additional comments Pt feeling much. Pt has followed up with cardiology.          Evelina Mcneill RN

## 2021-10-14 ENCOUNTER — LAB (OUTPATIENT)
Dept: LAB | Facility: HOSPITAL | Age: 56
End: 2021-10-14

## 2021-10-14 ENCOUNTER — OFFICE VISIT (OUTPATIENT)
Dept: UROLOGY | Facility: CLINIC | Age: 56
End: 2021-10-14

## 2021-10-14 VITALS — HEIGHT: 73 IN | WEIGHT: 224.2 LBS | BODY MASS INDEX: 29.72 KG/M2 | RESPIRATION RATE: 12 BRPM

## 2021-10-14 DIAGNOSIS — Z12.5 PROSTATE CANCER SCREENING: ICD-10-CM

## 2021-10-14 DIAGNOSIS — A63.0 CONDYLOMA ACUMINATA: ICD-10-CM

## 2021-10-14 DIAGNOSIS — N50.811 PAIN IN RIGHT TESTICLE: Primary | ICD-10-CM

## 2021-10-14 LAB
BILIRUB BLD-MCNC: NEGATIVE MG/DL
CLARITY, POC: CLEAR
COLOR UR: YELLOW
EXPIRATION DATE: ABNORMAL
GLUCOSE UR STRIP-MCNC: NEGATIVE MG/DL
KETONES UR QL: NEGATIVE
LEUKOCYTE EST, POC: NEGATIVE
Lab: ABNORMAL
NITRITE UR-MCNC: NEGATIVE MG/ML
PH UR: 7 [PH] (ref 5–8)
PROT UR STRIP-MCNC: NEGATIVE MG/DL
PSA SERPL-MCNC: 0.53 NG/ML (ref 0–4)
RBC # UR STRIP: ABNORMAL /UL
SP GR UR: 1.02 (ref 1–1.03)
UROBILINOGEN UR QL: NORMAL

## 2021-10-14 PROCEDURE — G0103 PSA SCREENING: HCPCS

## 2021-10-14 PROCEDURE — 36415 COLL VENOUS BLD VENIPUNCTURE: CPT

## 2021-10-14 PROCEDURE — 81003 URINALYSIS AUTO W/O SCOPE: CPT | Performed by: NURSE PRACTITIONER

## 2021-10-14 PROCEDURE — 99213 OFFICE O/P EST LOW 20 MIN: CPT | Performed by: NURSE PRACTITIONER

## 2021-10-14 RX ORDER — IMIQUIMOD 12.5 MG/.25G
CREAM TOPICAL
Qty: 1 EACH | Refills: 0 | Status: SHIPPED | OUTPATIENT
Start: 2021-10-14 | End: 2022-03-31

## 2021-10-14 NOTE — PROGRESS NOTES
"Chief Complaint: Testicle Pain (Pt here for follow up.  Pt is doing better)    Subjective         History of Present Illness  Reji Samuel is a 55 y.o. male presents to [facility] UROLOGY to be seen for f/u testicular pain/ u/s.    He was treated with antibiotics for 4 weeks.     He states no further issues with pain or ejaculation.    Nocturia x 0 but states strong urge to void with a very full bladder upon awakening.     He does state he feels as if his HPV is going to flare.    He states there is a \"luittle bump on the base of the penis on the right side\"      He does have a pinpoint condyloma which he would like to have removed.  He states insurance will not cover imiquimod anymore.    He is still very concerned about prostate pathology and we will get him to have PSA checked today.    Previous:   He has been on sildenafil.    States testicular pain started about 6 weeks ago with the right testicle having pain. He states that when he was able to ejaculate he had some spraying and then relief of the pain.    He states there is also a \"lump\" in his right groin.         Objective     Past Medical History:   Diagnosis Date   • Acid reflux    • HTN (hypertension)    • STD (male)        Past Surgical History:   Procedure Laterality Date   • APPENDECTOMY     • CARDIAC CATHETERIZATION N/A 9/22/2021    Procedure: Left Heart Cath with possible coronary intervention;  Surgeon: Bg Palacios MD;  Location: [facility] INVASIVE LOCATION;  Service: Cardiology;  Laterality: N/A;   • COLONOSCOPY     • TONSILLECTOMY     • TUMOR REMOVAL           Current Outpatient Medications:   •  aspirin (aspirin) 81 MG EC tablet, You must continue daily aspirin and Brilinta for at least 1 year after your stent, Disp: 30 tablet, Rfl: 1  •  atorvastatin (LIPITOR) 80 MG tablet, Take 1 tablet by mouth Daily., Disp: 90 tablet, Rfl: 3  •  cetirizine (ZyrTEC Allergy) 10 MG tablet, Take 10 mg by mouth Daily., Disp: , Rfl: " "  •  cyclobenzaprine (FLEXERIL) 10 MG tablet, Take 10 mg by mouth 2 (Two) Times a Day As Needed for Muscle Spasms., Disp: , Rfl:   •  metoprolol succinate XL (TOPROL-XL) 25 MG 24 hr tablet, Take 1 tablet by mouth Daily., Disp: 30 tablet, Rfl: 1  •  nicotine (NICODERM CQ) 21 MG/24HR patch, Place 1 patch on the skin as directed by provider Daily., Disp: 30 each, Rfl: 0  •  omeprazole (priLOSEC) 20 MG capsule, Take 20 mg by mouth Daily As Needed., Disp: , Rfl:   •  sildenafil (REVATIO) 20 MG tablet, 1-5 tabs PRN sexual intercourse, Disp: 30 tablet, Rfl: 4  •  ticagrelor (BRILINTA) 90 MG tablet tablet, Take 1 tab two times daily  Indications: SD7503, AA9479, FW5125, NG3891, Disp: 28 tablet, Rfl: 0  •  imiquimod (ALDARA) 5 % cream, Apply 2 x a day for 3 days then off for 4 days may repeat x3 cycles, Disp: 1 each, Rfl: 0    Allergies   Allergen Reactions   • Benzocaine Hives   • Codeine Nausea And Vomiting        Family History   Problem Relation Age of Onset   • Hypertension Mother    • Hypertension Father    • Prostate cancer Paternal Uncle        Social History     Socioeconomic History   • Marital status:    Tobacco Use   • Smoking status: Current Every Day Smoker     Packs/day: 1.00     Types: Cigarettes   • Smokeless tobacco: Never Used   Vaping Use   • Vaping Use: Never used   Substance and Sexual Activity   • Alcohol use: Not Currently   • Drug use: Not Currently   • Sexual activity: Defer       Vital Signs:   Resp 12   Ht 185.4 cm (73\")   Wt 102 kg (224 lb 3.2 oz)   BMI 29.58 kg/m²           Result Review :   The following data was reviewed by: BRIELLE Gold on 09/14/2021:  Results for orders placed or performed in visit on 10/14/21   POC Urinalysis Dipstick, Automated    Specimen: Urine   Result Value Ref Range    Color Yellow Yellow, Straw, Dark Yellow, Yulia    Clarity, UA Clear Clear    Specific Gravity  1.020 1.005 - 1.030    pH, Urine 7.0 5.0 - 8.0    Leukocytes Negative Negative    " Nitrite, UA Negative Negative    Protein, POC Negative Negative mg/dL    Glucose, UA Negative Negative, 1000 mg/dL (3+) mg/dL    Ketones, UA Negative Negative    Urobilinogen, UA Normal Normal    Bilirubin Negative Negative    Blood, UA Trace (A) Negative    Lot Number [id number]     Expiration Date 2,022/8        PROCEDURE:  US SCROTUM AND TESTICLES     COMPARISON: None     INDICATIONS:  RIGHT TESTICLE PAIN     TECHNIQUE:    The scrotum and testicles were evaluated with gray scale and color duplex Doppler   sonography.       FINDINGS:          The right testicle measures 2.5 cm x 3.2 cm by 4.9 cm size.  No evidence of solid mass in the right   testicle.  There is normal arterial blood flow in the right testicle.  There is a benign cyst right   testicle measuring 2.6 mm x 2.8 mm in size.  The head of the right epididymis measures 2 cm x 1.1   cm in size no evidence of hydrocele fluid collection surrounding the right testicle or right   epididymis.     No evidence of right inguinal hernia or mass or adenopathy in the right inguinal region.     The left testicle measures 2.6 cm x 2.9 cm by 4.4 cm in size.  No evidence of mass in the left   testicle.  There is normal arterial blood flow in left testicle.  No hydrocele fluid collection   surrounds left testicle.  The head of the left epididymis is normal and measures 1.2 cm x 7.6 mm in   size     CONCLUSION:   1. The right left testicles are normal.  2. The the right left epididymis is normal.  3. No   evidence of hydrocele fluid collection surrounding the right or left testicles.  4. Small tiny   benign-appearing cysts right testicle.    Procedures        Assessment and Plan    Diagnoses and all orders for this visit:    1. Pain in right testicle (Primary)  -     POC Urinalysis Dipstick, Automated    2. Condyloma acuminata  -     imiquimod (ALDARA) 5 % cream; Apply 2 x a day for 3 days then off for 4 days may repeat x3 cycles  Dispense: 1 each; Refill: 0    3. Prostate  cancer screening  -     PSA Screen; Future         Discussed with patient at this point in time his testicular pain has resolved after antibiotic therapy this may have been related to an infectious process.     We will send in imiquimod and see if his insurance will pay.     We will have the patient draw PSA today and call with results.     I spent 10  minutes caring for Reji on this date of service. This time includes time spent by me in the following activities:reviewing tests, obtaining and/or reviewing a separately obtained history, performing a medically appropriate examination and/or evaluation , counseling and educating the patient/family/caregiver, ordering medications, tests, or procedures, and documenting information in the medical record  Follow Up   Return in 1 year (on 10/14/2022) for annual F/U .  Patient was given instructions and counseling regarding his condition or for health maintenance advice. Please see specific information pulled into the AVS if appropriate.         This document has been electronically signed by BRIELLE Gold  October 14, 2021 10:59 EDT

## 2021-10-19 ENCOUNTER — READMISSION MANAGEMENT (OUTPATIENT)
Dept: CALL CENTER | Facility: HOSPITAL | Age: 56
End: 2021-10-19

## 2021-10-19 NOTE — OUTREACH NOTE
AMI Week 3 Survey      Responses   [facility] patient discharged from? [facility]   Does the patient have one of the following disease processes/diagnoses(primary or secondary)? Acute MI (STEMI,NSTEMI)   Week 3 attempt successful? No   Unsuccessful attempts Attempt 1          Db Sullivan RN

## 2022-03-31 ENCOUNTER — OFFICE VISIT (OUTPATIENT)
Dept: CARDIOLOGY | Facility: CLINIC | Age: 57
End: 2022-03-31

## 2022-03-31 VITALS
BODY MASS INDEX: 29.69 KG/M2 | WEIGHT: 224 LBS | HEIGHT: 73 IN | SYSTOLIC BLOOD PRESSURE: 164 MMHG | HEART RATE: 74 BPM | DIASTOLIC BLOOD PRESSURE: 96 MMHG

## 2022-03-31 DIAGNOSIS — E78.2 MIXED HYPERLIPIDEMIA: ICD-10-CM

## 2022-03-31 DIAGNOSIS — I25.10 CORONARY ARTERY DISEASE INVOLVING NATIVE CORONARY ARTERY OF NATIVE HEART WITHOUT ANGINA PECTORIS: Primary | ICD-10-CM

## 2022-03-31 DIAGNOSIS — F17.200 SMOKING: ICD-10-CM

## 2022-03-31 DIAGNOSIS — I10 ESSENTIAL HYPERTENSION: ICD-10-CM

## 2022-03-31 PROCEDURE — 99214 OFFICE O/P EST MOD 30 MIN: CPT | Performed by: INTERNAL MEDICINE

## 2022-03-31 RX ORDER — AMLODIPINE BESYLATE 5 MG/1
5 TABLET ORAL DAILY
Qty: 90 TABLET | Refills: 3 | Status: SHIPPED | OUTPATIENT
Start: 2022-03-31 | End: 2022-10-13 | Stop reason: SDUPTHER

## 2022-03-31 NOTE — PROGRESS NOTES
Chief Complaint  Coronary Artery Disease, Hypertension, and Hyperlipidemia    Subjective      Patient returns clinic for follow-up on CAD.  He has been doing well for the most part.  He has sporadic episodes of sharp left-sided chest discomfort which lasts a second or 2.  It is different from the chest discomfort he experienced with his previous heart attack.  He feels good otherwise.  He has no dyspnea, edema, dizziness, presyncope or syncope.  He is physically active without extraordinary symptoms.  His blood pressure has been elevated      Past Medical History:   Diagnosis Date   • Acid reflux    • Coronary artery disease    • HTN (hypertension)    • Hyperlipidemia    • STD (male)          Current Outpatient Medications:   •  aspirin (aspirin) 81 MG EC tablet, You must continue daily aspirin and Brilinta for at least 1 year after your stent, Disp: 30 tablet, Rfl: 1  •  atorvastatin (LIPITOR) 80 MG tablet, Take 1 tablet by mouth Daily., Disp: 90 tablet, Rfl: 3  •  cetirizine (zyrTEC) 10 MG tablet, Take 10 mg by mouth Daily., Disp: , Rfl:   •  metoprolol succinate XL (TOPROL-XL) 25 MG 24 hr tablet, Take 1 tablet by mouth Daily., Disp: 30 tablet, Rfl: 1  •  omeprazole (priLOSEC) 20 MG capsule, Take 20 mg by mouth Daily As Needed., Disp: , Rfl:   •  sildenafil (REVATIO) 20 MG tablet, 1-5 tabs PRN sexual intercourse, Disp: 30 tablet, Rfl: 4  •  ticagrelor (BRILINTA) 90 MG tablet tablet, Take 1 tab two times daily  Indications: [id number], [id number], [id number], [id number], Disp: 28 tablet, Rfl: 0  •  amLODIPine (NORVASC) 5 MG tablet, Take 1 tablet by mouth Daily., Disp: 90 tablet, Rfl: 3    Medications Discontinued During This Encounter   Medication Reason   • imiquimod (ALDARA) 5 % cream *Therapy completed   • cyclobenzaprine (FLEXERIL) 10 MG tablet    • nicotine (NICODERM CQ) 21 MG/24HR patch      Allergies   Allergen Reactions   • Benzocaine Hives   • Codeine Nausea And Vomiting        Social History     Tobacco Use   • Smoking  "status: Current Every Day Smoker     Packs/day: 1.00     Types: Cigarettes   • Smokeless tobacco: Never Used   Vaping Use   • Vaping Use: Never used   Substance Use Topics   • Alcohol use: Not Currently   • Drug use: Not Currently       Family History   Problem Relation Age of Onset   • Hypertension Mother    • Hypertension Father    • Prostate cancer Paternal Uncle         Objective     /96 (BP Location: Left arm)   Pulse 74   Ht 185.4 cm (73\")   Wt 102 kg (224 lb)   BMI 29.55 kg/m²       Physical Exam    General Appearance:   · no acute distress  · Alert and oriented x3  HENT:   · lips not cyanotic  · Atraumatic  Neck:  · No jvd   · supple  Respiratory:  · no respiratory distress  · normal breath sounds  · no rales  Cardiovascular:  · no S3, no S4   · no murmur  · no rub  Extremities  · No cyanosis  · lower extremity edema: none    Skin:   · warm, dry  · No rashes      Result Review :     proBNP   Date Value Ref Range Status   09/21/2021 38.2 0.0 - 900.0 pg/mL Final     CMP    CMP 9/21/21 9/22/21 9/22/21 9/23/21     0415 1448    Glucose 130 (A) 113 (A) 103 (A) 93   BUN 13 12 12 11   Creatinine 1.00 0.98 1.02 0.97   eGFR Non African Am 78 79 76 80   Sodium 137 140 138 143   Potassium 3.5 4.0 3.9 4.2   Chloride 101 103 101 105   Calcium 9.1 8.9 9.5 9.3   Albumin 4.30 4.20     Total Bilirubin 0.4 0.2     Alkaline Phosphatase 52 55     AST (SGOT) 23 29     ALT (SGPT) 25 27     (A) Abnormal value       Comments are available for some flowsheets but are not being displayed.           CBC w/diff    CBC w/Diff 9/21/21 9/22/21 9/22/21 9/23/21     0415 1448    WBC 14.06 (A) 11.86 (A) 10.19 14.86 (A)   RBC 4.96 5.00 4.92 4.89   Hemoglobin 16.1 15.9 16.0 15.4   Hematocrit 45.4 46.7 45.1 44.9   MCV 91.5 93.4 91.7 91.8   MCH 32.5 31.8 32.5 31.5   MCHC 35.5 34.0 35.5 34.3   RDW 13.4 13.5 13.2 13.2   Platelets 291 296 281 303   Neutrophil Rel % 49.6 35.4 (A)     Immature Granulocyte Rel % 0.4 0.3     Lymphocyte Rel % " 36.6 49.7 (A)     Monocyte Rel % 8.5 7.8     Eosinophil Rel % 4.0 5.6     Basophil Rel % 0.9 1.2     (A) Abnormal value             Lab Results   Component Value Date    TSH 1.380 06/06/2019      Lab Results   Component Value Date    FREET4 1.0 06/06/2019      No results found for: DDIMERQUANT  Magnesium   Date Value Ref Range Status   09/22/2021 2.1 1.6 - 2.6 mg/dL Final      No results found for: DIGOXIN   Lab Results   Component Value Date    TROPONINT 0.101 (C) 09/21/2021             Lab Results   Component Value Date    POCTROP 0.24 09/21/2021       Results for orders placed during the hospital encounter of 09/21/21    Adult Transthoracic Echo Complete W/ Cont if Necessary Per Protocol    Interpretation Summary  · Calculated left ventricular EF = 64% Estimated left ventricular EF was in agreement with the calculated left ventricular EF.  · Left ventricular diastolic function is consistent with (grade I) impaired relaxation.  · No significant valvular pathology.                 Diagnoses and all orders for this visit:    1. Coronary artery disease involving native coronary artery of native heart without angina pectoris (Primary)    2. Mixed hyperlipidemia  -     Lipid Panel; Future    3. Essential hypertension  -     amLODIPine (NORVASC) 5 MG tablet; Take 1 tablet by mouth Daily.  Dispense: 90 tablet; Refill: 3    4. Smoking        Assessment:    -CAD, history of non-STEMI, status post PCI of OM1 with a drug-eluting stent.  He has intermediate nonobstructive stenosis of mid to distal RCA confirmed by pressure wire.  LVEF is preserved.  He has noncardiac chest discomfort which is more likely musculoskeletal in origin.  Continue current medical therapy including DAPT for at least 1 year from the time of stent placement followed by aspirin for life.    -Mixed dyslipidemia: Continue high intensity statin.  Will check lipid profile to ensure her LDL is at goal.    -Hypertension: Blood pressure has been elevated.   Amlodipine 5 mg daily will be added to his current regimen for better blood pressure control.  Blood pressure monitoring was advised.  He will report to us if his blood pressure remains elevated.    Follow Up     Return in about 6 months (around 9/30/2022).        Patient was given instructions and counseling regarding his condition or for health maintenance advice. Please see specific information pulled into the AVS if appropriate.

## 2022-04-15 ENCOUNTER — LAB (OUTPATIENT)
Dept: LAB | Facility: HOSPITAL | Age: 57
End: 2022-04-15

## 2022-04-15 DIAGNOSIS — E78.2 MIXED HYPERLIPIDEMIA: ICD-10-CM

## 2022-04-15 LAB
CHOLEST SERPL-MCNC: 129 MG/DL (ref 0–200)
HDLC SERPL-MCNC: 26 MG/DL (ref 40–60)
LDLC SERPL CALC-MCNC: 80 MG/DL (ref 0–100)
LDLC/HDLC SERPL: 3.01 {RATIO}
TRIGL SERPL-MCNC: 124 MG/DL (ref 0–150)
VLDLC SERPL-MCNC: 23 MG/DL (ref 5–40)

## 2022-04-15 PROCEDURE — 36415 COLL VENOUS BLD VENIPUNCTURE: CPT

## 2022-04-15 PROCEDURE — 80061 LIPID PANEL: CPT

## 2022-04-18 ENCOUNTER — TELEPHONE (OUTPATIENT)
Dept: CARDIOLOGY | Facility: CLINIC | Age: 57
End: 2022-04-18

## 2022-04-18 DIAGNOSIS — E78.2 MIXED HYPERLIPIDEMIA: ICD-10-CM

## 2022-04-18 DIAGNOSIS — I25.10 CORONARY ARTERY DISEASE INVOLVING NATIVE CORONARY ARTERY OF NATIVE HEART WITHOUT ANGINA PECTORIS: Primary | ICD-10-CM

## 2022-04-18 RX ORDER — EZETIMIBE 10 MG/1
10 TABLET ORAL DAILY
Qty: 90 TABLET | Refills: 3 | Status: SHIPPED | OUTPATIENT
Start: 2022-04-18

## 2022-05-27 ENCOUNTER — TELEPHONE (OUTPATIENT)
Dept: UROLOGY | Facility: CLINIC | Age: 57
End: 2022-05-27

## 2022-05-27 DIAGNOSIS — A63.0 CONDYLOMA ACUMINATA: Primary | ICD-10-CM

## 2022-05-27 RX ORDER — IMIQUIMOD 12.5 MG/.25G
CREAM TOPICAL
Qty: 1 EACH | Refills: 0 | Status: SHIPPED | OUTPATIENT
Start: 2022-05-27 | End: 2022-10-13

## 2022-05-27 NOTE — TELEPHONE ENCOUNTER
Patient called requesting a refill on Imiquimod. He saw Marquita in October 2021 and is scheduled to see her again in October 2022.

## 2022-10-07 DIAGNOSIS — I25.10 CORONARY ARTERY DISEASE INVOLVING NATIVE CORONARY ARTERY OF NATIVE HEART WITHOUT ANGINA PECTORIS: ICD-10-CM

## 2022-10-10 RX ORDER — ATORVASTATIN CALCIUM 80 MG/1
TABLET, FILM COATED ORAL
Qty: 90 TABLET | Refills: 1 | Status: SHIPPED | OUTPATIENT
Start: 2022-10-10 | End: 2023-03-27

## 2022-10-13 ENCOUNTER — OFFICE VISIT (OUTPATIENT)
Dept: CARDIOLOGY | Facility: CLINIC | Age: 57
End: 2022-10-13

## 2022-10-13 VITALS
SYSTOLIC BLOOD PRESSURE: 140 MMHG | BODY MASS INDEX: 29.82 KG/M2 | HEIGHT: 73 IN | HEART RATE: 74 BPM | WEIGHT: 225 LBS | DIASTOLIC BLOOD PRESSURE: 98 MMHG

## 2022-10-13 DIAGNOSIS — I25.10 CORONARY ARTERY DISEASE INVOLVING NATIVE CORONARY ARTERY OF NATIVE HEART WITHOUT ANGINA PECTORIS: Primary | ICD-10-CM

## 2022-10-13 DIAGNOSIS — R29.818 SUSPECTED SLEEP APNEA: ICD-10-CM

## 2022-10-13 DIAGNOSIS — E78.2 MIXED HYPERLIPIDEMIA: ICD-10-CM

## 2022-10-13 DIAGNOSIS — I10 ESSENTIAL HYPERTENSION: ICD-10-CM

## 2022-10-13 DIAGNOSIS — R07.89 CHEST PAIN, ATYPICAL: ICD-10-CM

## 2022-10-13 PROCEDURE — 99214 OFFICE O/P EST MOD 30 MIN: CPT | Performed by: INTERNAL MEDICINE

## 2022-10-13 RX ORDER — LORAZEPAM 1 MG/1
TABLET ORAL AS NEEDED
COMMUNITY
Start: 2022-08-11

## 2022-10-13 RX ORDER — HYDROXYZINE HYDROCHLORIDE 25 MG/1
25 TABLET, FILM COATED ORAL
COMMUNITY
Start: 2022-08-24

## 2022-10-13 RX ORDER — AMLODIPINE BESYLATE 10 MG/1
10 TABLET ORAL DAILY
Qty: 90 TABLET | Refills: 3 | Status: SHIPPED | OUTPATIENT
Start: 2022-10-13

## 2022-10-13 NOTE — PROGRESS NOTES
Chief Complaint  Coronary Artery Disease and Hypertension    Subjective      Patient returns clinic for follow-up on coronary disease.  He status post previous PCI of the obtuse marginal branch in September 2021.  He has residual intermediate RCA disease.  He has been having sporadic episodes of anterior and left-sided chest discomfort which seems to be exacerbated by emotional stress.  It is self-limited and usually resolves within few minutes.  It is similar in character to the chest discomfort he had before PCI but much milder.  He has no significant dyspnea, orthopnea, palpitations, presyncope or syncope.  He has apneic spells witnessed by his wife.  His blood pressure has been elevated.    Past Medical History:   Diagnosis Date   • Acid reflux    • Coronary artery disease    • HTN (hypertension)    • Hyperlipidemia    • STD (male)          Current Outpatient Medications:   •  amLODIPine (NORVASC) 5 MG tablet, Take 1 tablet by mouth Daily., Disp: 90 tablet, Rfl: 3  •  aspirin (aspirin) 81 MG EC tablet, You must continue daily aspirin and Brilinta for at least 1 year after your stent, Disp: 30 tablet, Rfl: 1  •  atorvastatin (LIPITOR) 80 MG tablet, TAKE ONE TABLET BY MOUTH ONCE DAILY FOR CHOLESTEROL., Disp: 90 tablet, Rfl: 1  •  cetirizine (zyrTEC) 10 MG tablet, Take 10 mg by mouth Daily., Disp: , Rfl:   •  ezetimibe (ZETIA) 10 MG tablet, Take 1 tablet by mouth Daily., Disp: 90 tablet, Rfl: 3  •  metoprolol succinate XL (TOPROL-XL) 25 MG 24 hr tablet, Take 1 tablet by mouth Daily., Disp: 30 tablet, Rfl: 1  •  omeprazole (priLOSEC) 20 MG capsule, Take 20 mg by mouth Daily As Needed., Disp: , Rfl:   •  sildenafil (REVATIO) 20 MG tablet, 1-5 tabs PRN sexual intercourse, Disp: 30 tablet, Rfl: 4  •  ticagrelor (Brilinta) 90 MG tablet tablet, TAKE ONE TABLET BY MOUTH TWICE A DAY. YOU MUST CONTINUE BRILINTA AND ASPIRIN FOR AT LEAST 1 YEAR AFTER YOUR STENT, Disp: 180 tablet, Rfl: 2  •  imiquimod (Aldara) 5 % cream, Apply  "to affected area 2 times a day for 3 days stop for 4 days may repeat x3 cycles, Disp: 1 each, Rfl: 0    There are no discontinued medications.  Allergies   Allergen Reactions   • Benzocaine Hives   • Codeine Nausea And Vomiting        Social History     Tobacco Use   • Smoking status: Every Day     Packs/day: 1.00     Types: Cigarettes   • Smokeless tobacco: Never   Vaping Use   • Vaping Use: Never used   Substance Use Topics   • Alcohol use: Not Currently   • Drug use: Not Currently       Family History   Problem Relation Age of Onset   • Hypertension Mother    • Hypertension Father    • Prostate cancer Paternal Uncle         Objective     /91   Pulse 66   Ht 185.4 cm (73\")   Wt 102 kg (225 lb)   BMI 29.69 kg/m²       Physical Exam    General Appearance:   · no acute distress  · Alert and oriented x3  HENT:   · lips not cyanotic  · Atraumatic  Neck:  · No jvd   · supple  Respiratory:  · no respiratory distress  · normal breath sounds  · no rales  Cardiovascular:  · no S3, no S4   · no murmur  · no rub  Extremities  · No cyanosis  · lower extremity edema: none    Skin:   · warm, dry  · No rashes      Result Review :     proBNP   Date Value Ref Range Status   09/21/2021 38.2 0.0 - 900.0 pg/mL Final          Lab Results   Component Value Date    TSH 1.380 06/06/2019      Lab Results   Component Value Date    FREET4 1.0 06/06/2019      No results found for: DDIMERQUANT  Magnesium   Date Value Ref Range Status   09/22/2021 2.1 1.6 - 2.6 mg/dL Final      No results found for: DIGOXIN   Lab Results   Component Value Date    TROPONINT 0.101 (C) 09/21/2021           Lipid Panel    Lipid Panel 4/15/22   Total Cholesterol 129   Triglycerides 124   HDL Cholesterol 26 (A)   VLDL Cholesterol 23   LDL Cholesterol  80   LDL/HDL Ratio 3.01   (A) Abnormal value            Lab Results   Component Value Date    POCTROP 0.24 09/21/2021       Results for orders placed during the hospital encounter of 09/21/21    Adult " Transthoracic Echo Complete W/ Cont if Necessary Per Protocol    Interpretation Summary  · Calculated left ventricular EF = 64% Estimated left ventricular EF was in agreement with the calculated left ventricular EF.  · Left ventricular diastolic function is consistent with (grade I) impaired relaxation.  · No significant valvular pathology.                 Diagnoses and all orders for this visit:    1. Coronary artery disease involving native coronary artery of native heart without angina pectoris (Primary)    2. Mixed hyperlipidemia    3. Essential hypertension      Assessment:     -CAD, history of non-STEMI, status post PCI of OM1 with a drug-eluting stent September 2021.  He has intermediate nonobstructive stenosis of mid to distal RCA confirmed by pressure wire.  LVEF is preserved.    He has been having recurrent episodes of chest discomfort as described in HPI.  His symptoms are concerning for CAD progression.  He will be scheduled for exercise nuclear stress test to assess for myocardial ischemia.  Continue current medical therapy including aspirin, atorvastatin, metoprolol and Brilinta.  Amlodipine dose will be increased to 10 mg daily for better blood pressure control and for its anti-ischemic effects.  He will be seen back in 1 month for follow-up.    -Mixed dyslipidemia: Continue high intensity statin.    LDL from April 2022 was 80.  We will repeat lipid profile.  Will start PCSK9 inhibitor if LDL remains over 70.     -Hypertension: Blood pressure has been elevated.  Amlodipine dose will be increased to 10 mg daily.  Continue other blood pressure medications and blood pressure monitoring.    -Suspected sleep apnea: He will be referred to sleep medicine for further evaluation.    -Smoking: He continues to smoke but has cut down to half a pack per day.  The importance of complete smoking cessation was discussed with him at length.  He expressed understanding.      Follow Up     No follow-ups on  file.        Patient was given instructions and counseling regarding his condition or for health maintenance advice. Please see specific information pulled into the AVS if appropriate.

## 2022-11-01 ENCOUNTER — OFFICE VISIT (OUTPATIENT)
Dept: UROLOGY | Facility: CLINIC | Age: 57
End: 2022-11-01

## 2022-11-01 VITALS
DIASTOLIC BLOOD PRESSURE: 82 MMHG | HEART RATE: 92 BPM | SYSTOLIC BLOOD PRESSURE: 150 MMHG | HEIGHT: 73 IN | TEMPERATURE: 98.5 F | BODY MASS INDEX: 30.09 KG/M2 | WEIGHT: 227 LBS

## 2022-11-01 DIAGNOSIS — N40.0 BENIGN PROSTATIC HYPERPLASIA, UNSPECIFIED WHETHER LOWER URINARY TRACT SYMPTOMS PRESENT: Primary | ICD-10-CM

## 2022-11-01 DIAGNOSIS — F41.8 ANXIETY ABOUT HEALTH: ICD-10-CM

## 2022-11-01 DIAGNOSIS — A63.0 GENITAL WARTS: ICD-10-CM

## 2022-11-01 PROBLEM — R45.89 ANXIETY ABOUT HEALTH: Status: ACTIVE | Noted: 2021-09-21

## 2022-11-01 LAB
BILIRUB BLD-MCNC: NEGATIVE MG/DL
CLARITY, POC: CLEAR
COLOR UR: YELLOW
GLUCOSE UR STRIP-MCNC: NEGATIVE MG/DL
KETONES UR QL: NEGATIVE
LEUKOCYTE EST, POC: NEGATIVE
NITRITE UR-MCNC: NEGATIVE MG/ML
PH UR: 5.5 [PH] (ref 5–8)
PROT UR STRIP-MCNC: NEGATIVE MG/DL
RBC # UR STRIP: NEGATIVE /UL
SP GR UR: 1 (ref 1–1.03)
UROBILINOGEN UR QL: NORMAL

## 2022-11-01 PROCEDURE — 99213 OFFICE O/P EST LOW 20 MIN: CPT | Performed by: NURSE PRACTITIONER

## 2022-11-01 PROCEDURE — 84153 ASSAY OF PSA TOTAL: CPT | Performed by: NURSE PRACTITIONER

## 2022-11-01 PROCEDURE — 81002 URINALYSIS NONAUTO W/O SCOPE: CPT | Performed by: NURSE PRACTITIONER

## 2022-11-01 NOTE — PROGRESS NOTES
"Chief Complaint  Nocturia (Sometimes.) and history of Genital Warts (Last PSA 10/14/21 0.529 )    Subjective          Reji Samuel 57 y.o. male presents to [facility] UROLOG[facility]  History of Present Illness  Transfer of care from [facility] [address]. Patient followed for complaints of genital warts and PE.  Dr De La Garza excision of penile lesions 10/2/2020 and provided with sildenafil for Premature ejaculation. History of testicular pain Last seen per BRIELLE Whitfield and prescribed imiquimod cream. Small spot on right base of penis and cream did not work. He decided to burn area off himself. He admits having been diagnosed with condyloma caused some psychological trauma and has anxiety of getting more lesions.  Patient denies personal history of prostate cancer. Paternal uncle had prostate cancer. Denies any voiding complaints. No dysuria or gross hematuria. He is now  and in a good relationship.       Procedure Visit Converted with Edi Pena MD (10/02/2020)- excision of penile lesions  Office Visit Converted with Edi Pena MD (09/11/2020)        Review of Systems   Constitutional: Negative for chills and fever.   Genitourinary: Negative for dysuria and hematuria.   Psychiatric/Behavioral: The patient is nervous/anxious.       Objective   Vital Signs:   /82   Pulse 92   Temp 98.5 °F (36.9 °C)   Ht 185.4 cm (73\")   Wt 103 kg (227 lb)   BMI 29.95 kg/m²      Past Surgical History:   Procedure Laterality Date   • APPENDECTOMY     • CARDIAC CATHETERIZATION N/A 9/22/2021    Procedure: Left Heart Cath with possible coronary intervention;  Surgeon: Bg Palacios MD;  Location: [facility] CATH INVASIVE LOC[facility];  Service: Cardiology;  Laterality: N/A;   • COLONOSCOPY     • TONSILLECTOMY     • TUMOR REMOVAL          Physical Exam  Vitals and nursing note reviewed.   Constitutional:       General: He is not in acute distress.     Appearance: Normal appearance. He " is well-developed. He is not ill-appearing.      Comments: Ambulates without difficulty   Cardiovascular:      Rate and Rhythm: Normal rate.   Pulmonary:      Effort: Pulmonary effort is normal.      Breath sounds: Normal air entry.   Abdominal:      Hernia: There is no hernia in the left inguinal area or right inguinal area.   Genitourinary:     Penis: Circumcised. No phimosis, erythema, tenderness, discharge or swelling.       Epididymis:      Right: Not inflamed. No tenderness.      Left: Not inflamed. No tenderness.      Comments: Prostate approximately 20g no nodules slightly congested. Cremasteric reflex hyperactive   Musculoskeletal:         General: Normal range of motion.   Skin:     General: Skin is warm and dry.   Neurological:      Mental Status: He is alert and oriented to person, place, and time.      Motor: Motor function is intact.   Psychiatric:         Mood and Affect: Mood normal.         Behavior: Behavior normal. Behavior is cooperative.         Thought Content: Thought content normal.         Judgment: Judgment normal.        Result Review :   The following data was reviewed by: BRIELLE Cartagena on 11/01/2022:          UA    Urinalysis 11/1/22   Ketones, UA Negative   Leukocytes, UA Negative                            US Scrotum & Testicles (10/01/2021 08:38)    Assessment and Plan    Diagnoses and all orders for this visit:    1. Benign prostatic hyperplasia, unspecified whether lower urinary tract symptoms present (Primary)  -     POC Urinalysis Dipstick  -     Cancel: PSA Diagnostic; Future  -     PSA Diagnostic    2. History of condyloma     3. Anxiety about health    Reassured patient that he does not currently have any visable condyloma and if small lesion concern may Call to have evaluated. All questions and concerns answered. Follow up annually.     Follow Up   No follow-ups on file.  Patient was given instructions and counseling regarding his condition or for health maintenance  advice. Please see specific information pulled into the AVS if appropriate.     Jackie Harvey, APRN

## 2022-11-02 LAB — PSA SERPL-MCNC: 0.52 NG/ML (ref 0–4)

## 2022-11-29 ENCOUNTER — TRANSCRIBE ORDERS (OUTPATIENT)
Dept: ADMINISTRATIVE | Facility: HOSPITAL | Age: 57
End: 2022-11-29

## 2022-11-29 DIAGNOSIS — J32.9 CHRONIC SINUSITIS, UNSPECIFIED LOCATION: Primary | ICD-10-CM

## 2022-11-29 DIAGNOSIS — J34.2 DEVIATED SEPTUM: ICD-10-CM

## 2022-11-29 DIAGNOSIS — G47.33 OSA (OBSTRUCTIVE SLEEP APNEA): ICD-10-CM

## 2022-12-28 ENCOUNTER — APPOINTMENT (OUTPATIENT)
Dept: CT IMAGING | Facility: HOSPITAL | Age: 57
End: 2022-12-28

## 2022-12-30 ENCOUNTER — TRANSCRIBE ORDERS (OUTPATIENT)
Dept: SLEEP MEDICINE | Facility: HOSPITAL | Age: 57
End: 2022-12-30

## 2022-12-30 DIAGNOSIS — G47.33 OBSTRUCTIVE SLEEP APNEA (ADULT) (PEDIATRIC): Primary | ICD-10-CM

## 2023-01-09 ENCOUNTER — HOSPITAL ENCOUNTER (OUTPATIENT)
Dept: SLEEP MEDICINE | Facility: HOSPITAL | Age: 58
Discharge: HOME | End: 2023-01-09
Admitting: OTOLARYNGOLOGY
Payer: COMMERCIAL

## 2023-01-09 DIAGNOSIS — G47.33 OBSTRUCTIVE SLEEP APNEA (ADULT) (PEDIATRIC): ICD-10-CM

## 2023-01-09 PROCEDURE — 95811 POLYSOM 6/>YRS CPAP 4/> PARM: CPT | Performed by: INTERNAL MEDICINE

## 2023-01-09 PROCEDURE — 95811 POLYSOM 6/>YRS CPAP 4/> PARM: CPT

## 2023-03-02 ENCOUNTER — TRANSCRIBE ORDERS (OUTPATIENT)
Dept: ADMINISTRATIVE | Facility: HOSPITAL | Age: 58
End: 2023-03-02
Payer: COMMERCIAL

## 2023-03-02 DIAGNOSIS — J32.9 CHRONIC SINUSITIS, UNSPECIFIED LOCATION: Primary | ICD-10-CM

## 2023-03-24 ENCOUNTER — HOSPITAL ENCOUNTER (OUTPATIENT)
Dept: NUCLEAR MEDICINE | Facility: HOSPITAL | Age: 58
Discharge: HOME | End: 2023-03-24
Payer: COMMERCIAL

## 2023-03-24 DIAGNOSIS — R07.89 CHEST PAIN, ATYPICAL: ICD-10-CM

## 2023-03-24 PROCEDURE — 93017 CV STRESS TEST TRACING ONLY: CPT

## 2023-03-24 PROCEDURE — 93018 CV STRESS TEST I&R ONLY: CPT | Performed by: INTERNAL MEDICINE

## 2023-03-24 PROCEDURE — A9502 TC99M TETROFOSMIN: HCPCS | Performed by: INTERNAL MEDICINE

## 2023-03-24 PROCEDURE — 0 TECHNETIUM TETROFOSMIN KIT: Performed by: INTERNAL MEDICINE

## 2023-03-24 PROCEDURE — 78452 HT MUSCLE IMAGE SPECT MULT: CPT

## 2023-03-24 PROCEDURE — 78452 HT MUSCLE IMAGE SPECT MULT: CPT | Performed by: INTERNAL MEDICINE

## 2023-03-24 RX ADMIN — TETROFOSMIN 1 DOSE: 1.38 INJECTION, POWDER, LYOPHILIZED, FOR SOLUTION INTRAVENOUS at 08:43

## 2023-03-24 RX ADMIN — TETROFOSMIN 1 DOSE: 1.38 INJECTION, POWDER, LYOPHILIZED, FOR SOLUTION INTRAVENOUS at 07:27

## 2023-03-26 LAB
BH CV IMMEDIATE POST RECOVERY TECH DATA SYMPTOMS: NORMAL
BH CV IMMEDIATE POST TECH DATA BLOOD PRESSURE: NORMAL MMHG
BH CV IMMEDIATE POST TECH DATA HEART RATE: 118 BPM
BH CV IMMEDIATE POST TECH DATA OXYGEN SATS: 96 %
BH CV REST NUCLEAR ISOTOPE DOSE: 10.8 MCI
BH CV SIX MINUTE RECOVERY TECH DATA BLOOD PRESSURE: NORMAL
BH CV SIX MINUTE RECOVERY TECH DATA HEART RATE: 102 BPM
BH CV SIX MINUTE RECOVERY TECH DATA OXYGEN SATURATION: 97 %
BH CV STRESS BP STAGE 1: NORMAL
BH CV STRESS BP STAGE 2: NORMAL
BH CV STRESS BP STAGE 3: NORMAL
BH CV STRESS DURATION MIN STAGE 1: 3
BH CV STRESS DURATION MIN STAGE 2: 3
BH CV STRESS DURATION MIN STAGE 3: 3
BH CV STRESS DURATION SEC STAGE 1: 0
BH CV STRESS DURATION SEC STAGE 2: 0
BH CV STRESS DURATION SEC STAGE 3: 0
BH CV STRESS GRADE STAGE 1: 10
BH CV STRESS GRADE STAGE 2: 12
BH CV STRESS GRADE STAGE 3: 12
BH CV STRESS HR STAGE 1: 112
BH CV STRESS HR STAGE 2: 130
BH CV STRESS HR STAGE 3: 145
BH CV STRESS METS STAGE 1: 5
BH CV STRESS METS STAGE 2: 7.5
BH CV STRESS METS STAGE 3: 7.1
BH CV STRESS NUCLEAR ISOTOPE DOSE: 38 MCI
BH CV STRESS O2 STAGE 1: 96
BH CV STRESS O2 STAGE 2: 95
BH CV STRESS O2 STAGE 3: 94
BH CV STRESS PROTOCOL 1: NORMAL
BH CV STRESS RECOVERY BP: NORMAL MMHG
BH CV STRESS RECOVERY HR: 102 BPM
BH CV STRESS RECOVERY O2: 97 %
BH CV STRESS SPEED STAGE 1: 1.7
BH CV STRESS SPEED STAGE 2: 2.5
BH CV STRESS SPEED STAGE 3: 2.8
BH CV STRESS STAGE 1: 1
BH CV STRESS STAGE 2: 2
BH CV STRESS STAGE 3: 3
BH CV THREE MINUTE POST TECH DATA BLOOD PRESSURE: NORMAL MMHG
BH CV THREE MINUTE POST TECH DATA HEART RATE: 104 BPM
BH CV THREE MINUTE POST TECH DATA OXYGEN SATURATION: 96 %
LV EF NUC BP: 61 %
MAXIMAL PREDICTED HEART RATE: 163 BPM
PERCENT MAX PREDICTED HR: 88.96 %
STRESS BASELINE BP: NORMAL MMHG
STRESS BASELINE HR: 75 BPM
STRESS O2 SAT REST: 99 %
STRESS PERCENT HR: 105 %
STRESS POST ESTIMATED WORKLOAD: 7.6 METS
STRESS POST EXERCISE DUR MIN: 7 MIN
STRESS POST EXERCISE DUR SEC: 33 SEC
STRESS POST O2 SAT PEAK: 94 %
STRESS POST PEAK BP: NORMAL MMHG
STRESS POST PEAK HR: 145 BPM
STRESS TARGET HR: 139 BPM

## 2023-03-27 ENCOUNTER — TELEPHONE (OUTPATIENT)
Dept: CARDIOLOGY | Facility: CLINIC | Age: 58
End: 2023-03-27
Payer: COMMERCIAL

## 2023-03-27 DIAGNOSIS — I25.10 CORONARY ARTERY DISEASE INVOLVING NATIVE CORONARY ARTERY OF NATIVE HEART WITHOUT ANGINA PECTORIS: ICD-10-CM

## 2023-03-27 RX ORDER — ATORVASTATIN CALCIUM 80 MG/1
TABLET, FILM COATED ORAL
Qty: 30 TABLET | Refills: 11 | Status: SHIPPED | OUTPATIENT
Start: 2023-03-27

## 2023-03-27 NOTE — PROGRESS NOTES
Notify patient the results of his stress test were unremarkable. There were no signs concerning for blockages in his heart. These tests are not 100% accurate so if he has further concerns, let us know.

## 2023-03-27 NOTE — TELEPHONE ENCOUNTER
----- Message from BRIELLE Gastelum sent at 3/27/2023  8:26 AM EDT -----  Notify patient the results of his stress test were unremarkable. There were no signs concerning for blockages in his heart. These tests are not 100% accurate so if he has further concerns, let us know.

## 2023-03-29 ENCOUNTER — HOSPITAL ENCOUNTER (OUTPATIENT)
Dept: CT IMAGING | Facility: HOSPITAL | Age: 58
Discharge: HOME | End: 2023-03-29
Admitting: PHYSICIAN ASSISTANT
Payer: COMMERCIAL

## 2023-03-29 DIAGNOSIS — J32.9 CHRONIC SINUSITIS, UNSPECIFIED LOCATION: ICD-10-CM

## 2023-03-29 PROCEDURE — 70486 CT MAXILLOFACIAL W/O DYE: CPT

## 2023-04-20 ENCOUNTER — OFFICE VISIT (OUTPATIENT)
Dept: CARDIOLOGY | Facility: CLINIC | Age: 58
End: 2023-04-20
Payer: COMMERCIAL

## 2023-04-20 VITALS
WEIGHT: 240 LBS | HEIGHT: 73 IN | BODY MASS INDEX: 31.81 KG/M2 | SYSTOLIC BLOOD PRESSURE: 126 MMHG | DIASTOLIC BLOOD PRESSURE: 81 MMHG | HEART RATE: 85 BPM

## 2023-04-20 DIAGNOSIS — I10 ESSENTIAL HYPERTENSION: ICD-10-CM

## 2023-04-20 DIAGNOSIS — E78.2 MIXED HYPERLIPIDEMIA: ICD-10-CM

## 2023-04-20 DIAGNOSIS — I25.10 CORONARY ARTERY DISEASE INVOLVING NATIVE CORONARY ARTERY OF NATIVE HEART WITHOUT ANGINA PECTORIS: ICD-10-CM

## 2023-04-20 RX ORDER — AMLODIPINE BESYLATE 10 MG/1
10 TABLET ORAL DAILY
Qty: 90 TABLET | Refills: 3 | Status: SHIPPED | OUTPATIENT
Start: 2023-04-20

## 2023-04-20 RX ORDER — EZETIMIBE 10 MG/1
10 TABLET ORAL DAILY
Qty: 90 TABLET | Refills: 3 | Status: SHIPPED | OUTPATIENT
Start: 2023-04-20

## 2023-04-20 RX ORDER — ROSUVASTATIN CALCIUM 20 MG/1
20 TABLET, COATED ORAL DAILY
Qty: 90 TABLET | Refills: 3 | Status: SHIPPED | OUTPATIENT
Start: 2023-04-20

## 2023-04-20 RX ORDER — METOPROLOL SUCCINATE 25 MG/1
25 TABLET, EXTENDED RELEASE ORAL
Qty: 30 TABLET | Refills: 1 | Status: SHIPPED | OUTPATIENT
Start: 2023-04-20

## 2023-04-20 NOTE — PROGRESS NOTES
Chief Complaint  Coronary Artery Disease, Hypertension, and Hyperlipidemia    Subjective      Patient is here for follow-up on recent testing which was done for chest discomfort.  He has history of CAD, status post PCI in September 2021.  He was having recurrent chest discomfort and shoulder pain which seems to have resolved after he discontinued atorvastatin.  He had recent myocardial perfusion scan which was unremarkable.  He has been feeling well.  He has no complaints today.  He has no angina, dyspnea, palpitations, presyncope or syncope.  He remains physically active without extraordinary limitations.  He continues to smoke but has cut back to 5 cigarettes a day.  He is planning on quitting.    Past Medical History:   Diagnosis Date   • Acid reflux    • Coronary artery disease    • HTN (hypertension)    • Hyperlipidemia    • STD (male)          Current Outpatient Medications:   •  amLODIPine (NORVASC) 10 MG tablet, Take 1 tablet by mouth Daily., Disp: 90 tablet, Rfl: 3  •  aspirin (aspirin) 81 MG EC tablet, You must continue daily aspirin and Brilinta for at least 1 year after your stent, Disp: 30 tablet, Rfl: 1  •  cetirizine (zyrTEC) 10 MG tablet, Take 1 tablet by mouth Daily., Disp: , Rfl:   •  ezetimibe (ZETIA) 10 MG tablet, Take 1 tablet by mouth Daily., Disp: 90 tablet, Rfl: 3  •  metoprolol succinate XL (TOPROL-XL) 25 MG 24 hr tablet, Take 1 tablet by mouth Daily., Disp: 30 tablet, Rfl: 1  •  omeprazole (priLOSEC) 20 MG capsule, Take 1 capsule by mouth Daily As Needed., Disp: , Rfl:   •  sildenafil (REVATIO) 20 MG tablet, 1-5 tabs PRN sexual intercourse, Disp: 30 tablet, Rfl: 4  •  ticagrelor (Brilinta) 90 MG tablet tablet, TAKE ONE TABLET BY MOUTH TWICE A DAY. YOU MUST CONTINUE BRILINTA AND ASPIRIN FOR AT LEAST 1 YEAR AFTER YOUR STENT, Disp: 180 tablet, Rfl: 2  •  hydrOXYzine (ATARAX) 25 MG tablet, Take 1 tablet by mouth. (Patient not taking: Reported on 4/20/2023), Disp: , Rfl:   •  LORazepam (ATIVAN)  "1 MG tablet, As Needed. (Patient not taking: Reported on 4/20/2023), Disp: , Rfl:   •  rosuvastatin (CRESTOR) 20 MG tablet, Take 1 tablet by mouth Daily., Disp: 90 tablet, Rfl: 3    Medications Discontinued During This Encounter   Medication Reason   • metoprolol succinate XL (TOPROL-XL) 25 MG 24 hr tablet Reorder   • ezetimibe (ZETIA) 10 MG tablet Reorder   • amLODIPine (NORVASC) 10 MG tablet Reorder   • atorvastatin (LIPITOR) 80 MG tablet Historical Med - Therapy completed     Allergies   Allergen Reactions   • Benzocaine Hives   • Codeine Nausea And Vomiting        Social History     Tobacco Use   • Smoking status: Every Day     Packs/day: 1.00     Types: Cigarettes   • Smokeless tobacco: Never   Vaping Use   • Vaping Use: Never used   Substance Use Topics   • Alcohol use: Not Currently   • Drug use: Not Currently       Family History   Problem Relation Age of Onset   • Hypertension Mother    • Hypertension Father    • Prostate cancer Paternal Uncle         Objective     /81   Pulse 85   Ht 185.4 cm (72.99\")   Wt 109 kg (240 lb)   BMI 31.67 kg/m²       Physical Exam    General Appearance:   · no acute distress  · Alert and oriented x3  HENT:   · lips not cyanotic  · Atraumatic  Neck:  · No jvd   · supple  Respiratory:  · no respiratory distress  · normal breath sounds  · no rales  Cardiovascular:  · no S3, no S4   · no murmur  · no rub  Extremities  · No cyanosis  · lower extremity edema: none    Skin:   · warm, dry  · No rashes      Result Review :     proBNP   Date Value Ref Range Status   09/21/2021 38.2 0.0 - 900.0 pg/mL Final          Lab Results   Component Value Date    TSH 1.380 06/06/2019      Lab Results   Component Value Date    FREET4 1.0 06/06/2019      No results found for: DDIMERQUANT  Magnesium   Date Value Ref Range Status   09/22/2021 2.1 1.6 - 2.6 mg/dL Final      No results found for: DIGOXIN   Lab Results   Component Value Date    TROPONINT 0.101 (C) 09/21/2021             Lab " Results   Component Value Date    POCTROP 0.24 09/21/2021       Results for orders placed during the hospital encounter of 09/21/21    Adult Transthoracic Echo Complete W/ Cont if Necessary Per Protocol    Interpretation Summary  · Calculated left ventricular EF = 64% Estimated left ventricular EF was in agreement with the calculated left ventricular EF.  · Left ventricular diastolic function is consistent with (grade I) impaired relaxation.  · No significant valvular pathology.                 Diagnoses and all orders for this visit:    1. Essential hypertension  -     amLODIPine (NORVASC) 10 MG tablet; Take 1 tablet by mouth Daily.  Dispense: 90 tablet; Refill: 3    2. Coronary artery disease involving native coronary artery of native heart without angina pectoris  -     ezetimibe (ZETIA) 10 MG tablet; Take 1 tablet by mouth Daily.  Dispense: 90 tablet; Refill: 3  -     rosuvastatin (CRESTOR) 20 MG tablet; Take 1 tablet by mouth Daily.  Dispense: 90 tablet; Refill: 3    3. Mixed hyperlipidemia  -     ezetimibe (ZETIA) 10 MG tablet; Take 1 tablet by mouth Daily.  Dispense: 90 tablet; Refill: 3  -     rosuvastatin (CRESTOR) 20 MG tablet; Take 1 tablet by mouth Daily.  Dispense: 90 tablet; Refill: 3  -     Lipid Panel; Future    Other orders  -     metoprolol succinate XL (TOPROL-XL) 25 MG 24 hr tablet; Take 1 tablet by mouth Daily.  Dispense: 30 tablet; Refill: 1      Assessment:     -CAD, history of non-STEMI, status post PCI of OM1 with a drug-eluting stent in September 2021.  He has intermediate nonobstructive stenosis of mid to distal RCA confirmed by pressure wire.  LVEF is preserved.   He was having recurrent chest discomfort which seems to have resolved after he stopped atorvastatin.   He remains physically active without angina or angina-like symptoms.  Recent exercise nuclear stress test was noted and was benign.  I will go ahead and stop Brilinta at this stage.  He will be continued on aspirin and  metoprolol.  He will be rechallenged with rosuvastatin as discussed above.    -Mixed dyslipidemia: He could not tolerate atorvastatin.  He is on Zetia which will be continued.  He will be rechallenge with rosuvastatin 20 mg daily.  If he cannot tolerate this medication, he will be considered for PCSK9 inhibitor.  Lipid profile will be done in 2 months.     -Hypertension: Well-controlled on current regimen.  Continue same     -Smoking: He continues to smoke but has cut back to half a pack per day.  The importance of complete smoking cessation was discussed with him at length.  He expressed understanding.      Follow Up     Return in about 6 months (around 10/20/2023) for with Dr Herrera.        Patient was given instructions and counseling regarding his condition or for health maintenance advice. Please see specific information pulled into the AVS if appropriate.

## 2023-06-14 ENCOUNTER — TELEPHONE (OUTPATIENT)
Dept: CARDIOLOGY | Facility: CLINIC | Age: 58
End: 2023-06-14
Payer: COMMERCIAL

## 2023-06-14 NOTE — TELEPHONE ENCOUNTER
Caller: Reji Samuel     Relationship: SELF (PATIENT)    Best call back number: [phone] , CAN LEAVE VOICEMAIL    What is your medical concern? PT REPORTS SWELLING IN ANKLES AT NIGHT TIME ON DAILY BASIS. HE SAID IT GOES DOWN IN MORNING BUT COMES RIGHT BACK AT LUNCH TIME. CAN WALK FINE BUT LEFT CALF IS PAINFUL SINCE TODAY. PT MENTIONED HE HAS BEEN DRINKING PLENTY OF WATER. ALSO REPORTS SLIGHT CHEST PAIN A DAY OR TWO AGO, STATES IT FELT LIKE HOW IT DID BEFORE HE HAD STENTS PUT IN. HE SAID IT WAS VERY BRIEF AND HAS NOT EXPERIENCED IT AGAIN.     How long has this issue been going on? 2 WEEKS    Is your provider already aware of this issue? NO, PATIENT STATES THIS IS NEW ISSUE    Have you been treated for this issue? NO

## 2023-06-16 NOTE — TELEPHONE ENCOUNTER
"Attempted to call patient. Patient wife answered phone, unable to discuss matters with wife as he has \"none\" listed on his verbal consent. Left Call back number for patient to return call.   "

## 2023-06-16 NOTE — TELEPHONE ENCOUNTER
SW patient. Patient states over the last 3 weeks patient has noted he is having edema bilateral ankles, it goes down at night but by the afternoon they bother are swollen, denies SOA or chest pain- patient states he is no longer having pain in his left calf as reported previously.   Patient was worried the changes in his medications-stopping his Brilinta could be the cause of the swelling- patient has since restarted on his Brilinta.     Medications:   Brilinta BID  Norvasc at hs  Lipitor- patient restarted- unable to tolerate crestor  Zetia- at HS  Metoprolol AM  Aspirin AM    Please advise. Patient is currently at work, patient gives RN permission to share his medical information and would like to have list updated.

## 2023-06-16 NOTE — TELEPHONE ENCOUNTER
He can try cutting back on amlodipine to 5 mg daily since it is common to have swelling with this medication, he should monitor blood pressure daily to make sure it does not become too elevated with this medication dose change

## 2023-06-16 NOTE — TELEPHONE ENCOUNTER
SW patient's wife Lawrence as patient was not available- permission to leave with wife was approved by patient. Wife verbalized understanding and appreciation. Patient encouraged to call back if bp increases or swelling does not stop.

## 2023-10-18 ENCOUNTER — OFFICE VISIT (OUTPATIENT)
Dept: CARDIOLOGY | Facility: CLINIC | Age: 58
End: 2023-10-18
Payer: COMMERCIAL

## 2023-10-18 VITALS
HEART RATE: 71 BPM | BODY MASS INDEX: 32.2 KG/M2 | HEIGHT: 73 IN | WEIGHT: 243 LBS | DIASTOLIC BLOOD PRESSURE: 83 MMHG | SYSTOLIC BLOOD PRESSURE: 119 MMHG

## 2023-10-18 DIAGNOSIS — I10 ESSENTIAL HYPERTENSION: ICD-10-CM

## 2023-10-18 DIAGNOSIS — I25.10 CAD S/P PERCUTANEOUS CORONARY ANGIOPLASTY: ICD-10-CM

## 2023-10-18 DIAGNOSIS — I25.10 CORONARY ARTERY DISEASE INVOLVING NATIVE CORONARY ARTERY OF NATIVE HEART WITHOUT ANGINA PECTORIS: Primary | ICD-10-CM

## 2023-10-18 DIAGNOSIS — F17.200 SMOKING: ICD-10-CM

## 2023-10-18 DIAGNOSIS — E78.2 MIXED HYPERLIPIDEMIA: ICD-10-CM

## 2023-10-18 DIAGNOSIS — Z98.61 CAD S/P PERCUTANEOUS CORONARY ANGIOPLASTY: ICD-10-CM

## 2023-10-18 PROCEDURE — 99214 OFFICE O/P EST MOD 30 MIN: CPT | Performed by: INTERNAL MEDICINE

## 2023-10-18 RX ORDER — ATORVASTATIN CALCIUM 40 MG/1
40 TABLET, FILM COATED ORAL DAILY
Qty: 90 TABLET | Refills: 3 | Status: SHIPPED | OUTPATIENT
Start: 2023-10-18

## 2023-10-18 NOTE — PROGRESS NOTES
Chief Complaint  Hypertension and Coronary Artery Disease    Subjective      Patient is here for follow-up on CAD.  He is doing well cardiac wise.  He has no chest comfort or dyspnea.  Has no palpitations, presyncope or syncope.  He continues to smoke but cut back to less than 5 cigarettes a day.  He remains physically active without extraordinary limitations.    Past Medical History:   Diagnosis Date    Acid reflux     Coronary artery disease     HTN (hypertension)     Hyperlipidemia     STD (male)          Current Outpatient Medications:     amLODIPine (NORVASC) 10 MG tablet, Take 1 tablet by mouth Daily., Disp: 90 tablet, Rfl: 3    aspirin (aspirin) 81 MG EC tablet, You must continue daily aspirin and Brilinta for at least 1 year after your stent, Disp: 30 tablet, Rfl: 1    cetirizine (zyrTEC) 10 MG tablet, Take 1 tablet by mouth Daily., Disp: , Rfl:     ezetimibe (ZETIA) 10 MG tablet, Take 1 tablet by mouth Daily., Disp: 90 tablet, Rfl: 3    metoprolol succinate XL (TOPROL-XL) 25 MG 24 hr tablet, Take 1 tablet by mouth Daily., Disp: 30 tablet, Rfl: 1    omeprazole (priLOSEC) 20 MG capsule, Take 1 capsule by mouth Daily As Needed., Disp: , Rfl:     sildenafil (REVATIO) 20 MG tablet, 1-5 tabs PRN sexual intercourse, Disp: 30 tablet, Rfl: 4    ticagrelor (Brilinta) 90 MG tablet tablet, TAKE ONE TABLET BY MOUTH TWICE A DAY. YOU MUST CONTINUE BRILINTA AND ASPIRIN FOR AT LEAST 1 YEAR AFTER YOUR STENT, Disp: 180 tablet, Rfl: 2    Medications Discontinued During This Encounter   Medication Reason    hydrOXYzine (ATARAX) 25 MG tablet *Therapy completed    LORazepam (ATIVAN) 1 MG tablet *Therapy completed    rosuvastatin (CRESTOR) 20 MG tablet *Therapy completed     Allergies   Allergen Reactions    Benzocaine Hives    Codeine Nausea And Vomiting        Social History     Tobacco Use    Smoking status: Every Day     Packs/day: 1     Types: Cigarettes    Smokeless tobacco: Never   Vaping Use    Vaping Use: Never used  "  Substance Use Topics    Alcohol use: Not Currently    Drug use: Not Currently       Family History   Problem Relation Age of Onset    Hypertension Mother     Hypertension Father     Prostate cancer Paternal Uncle         Objective     /83   Pulse 71   Ht 185.4 cm (72.99\")   Wt 110 kg (243 lb)   BMI 32.07 kg/m²       Physical Exam    General Appearance:   no acute distress  Alert and oriented x3  HENT:   lips not cyanotic  Atraumatic  Neck:  No jvd   supple  Respiratory:  no respiratory distress  normal breath sounds  no rales  Cardiovascular:  no S3, no S4   no murmur  no rub  Extremities  No cyanosis  lower extremity edema: none    Skin:   warm, dry  No rashes      Result Review :     proBNP   Date Value Ref Range Status   09/21/2021 38.2 0.0 - 900.0 pg/mL Final          Lab Results   Component Value Date    TSH 1.380 06/06/2019      Lab Results   Component Value Date    FREET4 1.0 06/06/2019      No results found for: \"DDIMERQUANT\"  Magnesium   Date Value Ref Range Status   09/22/2021 2.1 1.6 - 2.6 mg/dL Final      No results found for: \"DIGOXIN\"   Lab Results   Component Value Date    TROPONINT 0.101 (C) 09/21/2021             Lab Results   Component Value Date    POCTROP 0.24 09/21/2021       Results for orders placed during the hospital encounter of 09/21/21    Adult Transthoracic Echo Complete W/ Cont if Necessary Per Protocol    Interpretation Summary  · Calculated left ventricular EF = 64% Estimated left ventricular EF was in agreement with the calculated left ventricular EF.  · Left ventricular diastolic function is consistent with (grade I) impaired relaxation.  · No significant valvular pathology.                 Diagnoses and all orders for this visit:    1. Coronary artery disease involving native coronary artery of native heart without angina pectoris (Primary)    2. CAD S/P percutaneous coronary angioplasty    3. Essential hypertension    4. Mixed hyperlipidemia    5. " Smoking      Assessment:     -CAD, history of non-STEMI, status post PCI of OM1 with a drug-eluting stent in September 2021.  He has intermediate nonobstructive stenosis of mid to distal RCA confirmed by pressure wire.  LVEF is preserved.   He remains physically active without angina or angina-like symptoms.  Continue current medications and clinical monitoring.     -Mixed dyslipidemia: He could not tolerate rosuvastatin or atorvastatin 80 mg daily.  He will be rechallenged with atorvastatin 40 mg daily.  He is on co-Q10 which she was advised to keep taking.  He is also on Zetia 10 mg which will be continued.  I will repeat lipid profile in 2 to 3 months.  If he cannot tolerate atorvastatin, he will be started on PCSK9 inhibitor.    -Hypertension: Well-controlled on current regimen.  Continue the same     -Smoking: He continues to smoke but has cut back to less than half a pack per day.  The importance of complete smoking cessation was discussed with him at length.      Follow Up     No follow-ups on file.        Patient was given instructions and counseling regarding his condition or for health maintenance advice. Please see specific information pulled into the AVS if appropriate.

## 2023-10-18 NOTE — PROGRESS NOTES
"Chief Complaint  Hypertension and Coronary Artery Disease    Subjective          Past Medical History:   Diagnosis Date    Acid reflux     Coronary artery disease     HTN (hypertension)     Hyperlipidemia     STD (male)          Current Outpatient Medications:     amLODIPine (NORVASC) 10 MG tablet, Take 1 tablet by mouth Daily., Disp: 90 tablet, Rfl: 3    aspirin (aspirin) 81 MG EC tablet, You must continue daily aspirin and Brilinta for at least 1 year after your stent, Disp: 30 tablet, Rfl: 1    cetirizine (zyrTEC) 10 MG tablet, Take 1 tablet by mouth Daily., Disp: , Rfl:     ezetimibe (ZETIA) 10 MG tablet, Take 1 tablet by mouth Daily., Disp: 90 tablet, Rfl: 3    metoprolol succinate XL (TOPROL-XL) 25 MG 24 hr tablet, Take 1 tablet by mouth Daily., Disp: 30 tablet, Rfl: 1    omeprazole (priLOSEC) 20 MG capsule, Take 1 capsule by mouth Daily As Needed., Disp: , Rfl:     sildenafil (REVATIO) 20 MG tablet, 1-5 tabs PRN sexual intercourse, Disp: 30 tablet, Rfl: 4    ticagrelor (Brilinta) 90 MG tablet tablet, TAKE ONE TABLET BY MOUTH TWICE A DAY. YOU MUST CONTINUE BRILINTA AND ASPIRIN FOR AT LEAST 1 YEAR AFTER YOUR STENT, Disp: 180 tablet, Rfl: 2    Medications Discontinued During This Encounter   Medication Reason    hydrOXYzine (ATARAX) 25 MG tablet *Therapy completed    LORazepam (ATIVAN) 1 MG tablet *Therapy completed    rosuvastatin (CRESTOR) 20 MG tablet *Therapy completed     Allergies   Allergen Reactions    Benzocaine Hives    Codeine Nausea And Vomiting        Social History     Tobacco Use    Smoking status: Every Day     Packs/day: 1     Types: Cigarettes    Smokeless tobacco: Never   Vaping Use    Vaping Use: Never used   Substance Use Topics    Alcohol use: Not Currently    Drug use: Not Currently       Family History   Problem Relation Age of Onset    Hypertension Mother     Hypertension Father     Prostate cancer Paternal Uncle         Objective     /83   Pulse 71   Ht 185.4 cm (72.99\")   Wt " "110 kg (243 lb)   BMI 32.07 kg/m²       Physical Exam    General Appearance:   no acute distress  Alert and oriented x3  HENT:   lips not cyanotic  Atraumatic  Neck:  No jvd   supple  Respiratory:  no respiratory distress  normal breath sounds  no rales  Cardiovascular:  ***  no S3, no S4   no murmur  no rub  Extremities  No cyanosis  lower extremity edema: none    Skin:   warm, dry  No rashes      Result Review :{Labs  Result Review  Imaging  Med Tab  Media :23}   {The following data was reviewed by (Optional):[id number]}  proBNP   Date Value Ref Range Status   09/21/2021 38.2 0.0 - 900.0 pg/mL Final          Lab Results   Component Value Date    TSH 1.380 06/06/2019      Lab Results   Component Value Date    FREET4 1.0 06/06/2019      No results found for: \"DDIMERQUANT\"  Magnesium   Date Value Ref Range Status   09/22/2021 2.1 1.6 - 2.6 mg/dL Final      No results found for: \"DIGOXIN\"   Lab Results   Component Value Date    TROPONINT 0.101 (C) 09/21/2021             Lab Results   Component Value Date    POCTROP 0.24 09/21/2021       Results for orders placed during the hospital encounter of 09/21/21    Adult Transthoracic Echo Complete W/ Cont if Necessary Per Protocol    Interpretation Summary  · Calculated left ventricular EF = 64% Estimated left ventricular EF was in agreement with the calculated left ventricular EF.  · Left ventricular diastolic function is consistent with (grade I) impaired relaxation.  · No significant valvular pathology.                 Diagnoses and all orders for this visit:    1. Coronary artery disease involving native coronary artery of native heart without angina pectoris (Primary)    2. CAD S/P percutaneous coronary angioplasty    3. Essential hypertension    4. Mixed hyperlipidemia    5. Smoking            Follow Up {Instructions Charge Capture  Follow-up Communications :23}    No follow-ups on file.        Patient was given instructions and counseling regarding his condition or " for health maintenance advice. Please see specific information pulled into the AVS if appropriate.

## 2023-10-19 RX ORDER — METOPROLOL SUCCINATE 25 MG/1
25 TABLET, EXTENDED RELEASE ORAL DAILY
Qty: 90 TABLET | Refills: 3 | Status: SHIPPED | OUTPATIENT
Start: 2023-10-19

## 2023-11-02 ENCOUNTER — OFFICE VISIT (OUTPATIENT)
Dept: UROLOGY | Facility: CLINIC | Age: 58
End: 2023-11-02
Payer: COMMERCIAL

## 2023-11-02 VITALS
WEIGHT: 239.4 LBS | BODY MASS INDEX: 31.73 KG/M2 | DIASTOLIC BLOOD PRESSURE: 77 MMHG | SYSTOLIC BLOOD PRESSURE: 141 MMHG | HEIGHT: 73 IN | HEART RATE: 98 BPM

## 2023-11-02 DIAGNOSIS — N52.9 ERECTILE DYSFUNCTION, UNSPECIFIED ERECTILE DYSFUNCTION TYPE: ICD-10-CM

## 2023-11-02 DIAGNOSIS — N40.0 BENIGN PROSTATIC HYPERPLASIA, UNSPECIFIED WHETHER LOWER URINARY TRACT SYMPTOMS PRESENT: Primary | ICD-10-CM

## 2023-11-02 PROBLEM — A63.0 GENITAL WARTS: Status: RESOLVED | Noted: 2021-10-14 | Resolved: 2023-11-02

## 2023-11-02 LAB
BILIRUB BLD-MCNC: NEGATIVE MG/DL
CLARITY, POC: CLEAR
COLOR UR: YELLOW
EXPIRATION DATE: ABNORMAL
GLUCOSE UR STRIP-MCNC: NEGATIVE MG/DL
KETONES UR QL: NEGATIVE
LEUKOCYTE EST, POC: NEGATIVE
Lab: ABNORMAL
NITRITE UR-MCNC: NEGATIVE MG/ML
PH UR: 7 [PH] (ref 5–8)
PROT UR STRIP-MCNC: NEGATIVE MG/DL
RBC # UR STRIP: ABNORMAL /UL
SP GR UR: 1.02 (ref 1–1.03)
UROBILINOGEN UR QL: ABNORMAL

## 2023-11-02 RX ORDER — SILDENAFIL CITRATE 20 MG/1
TABLET ORAL
Qty: 30 TABLET | Refills: 3 | Status: SHIPPED | OUTPATIENT
Start: 2023-11-02

## 2023-11-02 NOTE — PROGRESS NOTES
"Chief Complaint  Benign Prostatic Hypertrophy    Subjective          Reji Samuel is a 58 y.o. male who presents to [facility] UROLOGY  History of Present Illness  Annual follow up of bph. Patient reports doing well urologically. Does have some urinary frequency but associated with consuming more liquids and water. Denies testicular pain, left testicle retracts into pelvic region and has to be repositioned at times. No changes. Denies dysuria or gross hematuria. Using 1/2 tablet of 20mg sildenafil prn to help with firmness of erections. Unsure if some of it may be due to performance anxiety but it works well and requesting refill. Denies new penile lesions or concerns of condyloma.      Objective   Vital Signs:   /77 (BP Location: Right arm, Patient Position: Sitting, Cuff Size: Large Adult)   Pulse 98   Ht 185.4 cm (72.99\")   Wt 109 kg (239 lb 6.4 oz)   BMI 31.59 kg/m²     Allergies   Allergen Reactions    Benzocaine Hives    Codeine Nausea And Vomiting      Past medical history:  has a past medical history of Acid reflux, Coronary artery disease, HTN (hypertension), Hyperlipidemia, and STD (male).   Past surgical history:  has a past surgical history that includes Appendectomy; Tonsillectomy; Tumor removal; Colonoscopy; and Cardiac catheterization (N/A, 9/22/2021).  Personal history: family history includes Hypertension in his father and mother; Prostate cancer in his paternal uncle.  Social history:  reports that he has been smoking cigarettes. He has been smoking an average of 1 pack per day. He has never used smokeless tobacco. He reports that he does not currently use alcohol. He reports that he does not currently use drugs.    Review of Systems   Constitutional:  Negative for fever.   Genitourinary:  Negative for difficulty urinating, dysuria, hematuria, testicular pain and urgency.        Physical Exam  Vitals and nursing note reviewed.   Constitutional:       General: He is not " in acute distress.     Appearance: Normal appearance. He is well-developed and well-groomed. He is not ill-appearing.      Comments: Ambulates without difficulty   Cardiovascular:      Rate and Rhythm: Normal rate.   Pulmonary:      Effort: Pulmonary effort is normal.      Breath sounds: Normal air entry.   Abdominal:      Hernia: There is no hernia in the left inguinal area or right inguinal area.   Genitourinary:     Penis: Circumcised. No erythema, tenderness, discharge, swelling or lesions.       Testes:         Right: Mass or tenderness not present.         Left: Mass or tenderness not present.      Epididymis:      Right: No mass or tenderness.      Left: No mass.      Comments: Prostate approximately 20g no nodules lobular.   Musculoskeletal:         General: Normal range of motion.   Lymphadenopathy:      Lower Body: No right inguinal adenopathy. No left inguinal adenopathy.   Skin:     General: Skin is warm and dry.   Neurological:      Mental Status: He is alert and oriented to person, place, and time.      Motor: Motor function is intact.   Psychiatric:         Mood and Affect: Mood normal.         Behavior: Behavior normal. Behavior is cooperative.         Thought Content: Thought content normal.         Judgment: Judgment normal.        Result Review :           PSA   Date Value Ref Range Status   11/01/2022 0.519 0.000 - 4.000 ng/mL Final   10/14/2021 0.529 0.000 - 4.000 ng/mL Final        Results for orders placed or performed in visit on 11/02/23   POC Urinalysis Dipstick, Automated    Specimen: Urine   Result Value Ref Range    Color Yellow Yellow, Straw, Dark Yellow, Yulia    Clarity, UA Clear Clear    Specific Gravity  1.020 1.005 - 1.030    pH, Urine 7.0 5.0 - 8.0    Leukocytes Negative Negative    Nitrite, UA Negative Negative    Protein, POC Negative Negative mg/dL    Glucose, UA Negative Negative mg/dL    Ketones, UA Negative Negative    Urobilinogen, UA 0.2 E.U./dL Normal, 0.2 E.U./dL     Bilirubin Negative Negative    Blood, UA Trace in tact (A) Negative    Lot Number [id number]     Expiration Date 9/30/2024             Assessment and Plan    Diagnoses and all orders for this visit:    1. Benign prostatic hyperplasia, unspecified whether lower urinary tract symptoms present (Primary)  -     POC Urinalysis Dipstick, Automated  -     sildenafil (REVATIO) 20 MG tablet; 1 tablet po daily prn 45 minutes prior to activity.  Dispense: 30 tablet; Refill: 3  -     Cancel: PSA Diagnostic  -     PSA Diagnostic    2. Erectile dysfunction, unspecified erectile dysfunction type    SOFIA performed and psa level collected. Questions answered. Prescription refill of sildenafil prn as requested. Patient not on nitrates. Follow up 1 year for annual exam sooner if problems.        Follow Up   Return in about 1 year (around 11/2/2024) for Annual visit.  Patient was given instructions and counseling regarding his condition or for health maintenance advice. Please see specific information pulled into the AVS if appropriate.     BRIELLE Cartagena

## 2023-11-03 LAB — PSA SERPL-MCNC: 0.5 NG/ML (ref 0–4)

## 2023-11-03 PROCEDURE — 84153 ASSAY OF PSA TOTAL: CPT | Performed by: NURSE PRACTITIONER

## 2024-02-12 ENCOUNTER — OFFICE VISIT (OUTPATIENT)
Dept: OTOLARYNGOLOGY | Facility: CLINIC | Age: 59
End: 2024-02-12
Payer: COMMERCIAL

## 2024-02-12 VITALS
SYSTOLIC BLOOD PRESSURE: 143 MMHG | HEART RATE: 69 BPM | DIASTOLIC BLOOD PRESSURE: 93 MMHG | HEIGHT: 73 IN | BODY MASS INDEX: 31.59 KG/M2 | TEMPERATURE: 97.5 F

## 2024-02-12 DIAGNOSIS — K11.8 MASS OF BOTH PAROTID GLANDS: ICD-10-CM

## 2024-02-12 DIAGNOSIS — Z72.0 TOBACCO USE: ICD-10-CM

## 2024-02-12 DIAGNOSIS — M95.0 NASAL VALVE COLLAPSE: ICD-10-CM

## 2024-02-12 DIAGNOSIS — R09.81 CHRONIC NASAL CONGESTION: ICD-10-CM

## 2024-02-12 DIAGNOSIS — J34.2 DEVIATED NASAL SEPTUM: ICD-10-CM

## 2024-02-12 DIAGNOSIS — G47.33 OBSTRUCTIVE SLEEP APNEA: Primary | ICD-10-CM

## 2024-02-12 DIAGNOSIS — J32.9 CHRONIC SINUSITIS, UNSPECIFIED LOCATION: ICD-10-CM

## 2024-02-12 DIAGNOSIS — Z98.890 HISTORY OF SUPERFICIAL PAROTIDECTOMY: ICD-10-CM

## 2024-02-12 PROCEDURE — 99214 OFFICE O/P EST MOD 30 MIN: CPT | Performed by: OTOLARYNGOLOGY

## 2024-02-12 PROCEDURE — 31231 NASAL ENDOSCOPY DX: CPT | Performed by: OTOLARYNGOLOGY

## 2024-04-12 ENCOUNTER — LAB (OUTPATIENT)
Dept: LAB | Facility: HOSPITAL | Age: 59
End: 2024-04-12
Payer: COMMERCIAL

## 2024-04-12 DIAGNOSIS — E78.2 MIXED HYPERLIPIDEMIA: ICD-10-CM

## 2024-04-12 LAB
CHOLEST SERPL-MCNC: 206 MG/DL (ref 0–200)
HDLC SERPL-MCNC: 23 MG/DL (ref 40–60)
LDLC SERPL CALC-MCNC: 156 MG/DL (ref 0–100)
LDLC/HDLC SERPL: 6.67 {RATIO}
TRIGL SERPL-MCNC: 148 MG/DL (ref 0–150)
VLDLC SERPL-MCNC: 27 MG/DL (ref 5–40)

## 2024-04-12 PROCEDURE — 36415 COLL VENOUS BLD VENIPUNCTURE: CPT

## 2024-04-12 PROCEDURE — 80061 LIPID PANEL: CPT

## 2024-04-18 ENCOUNTER — OFFICE VISIT (OUTPATIENT)
Dept: CARDIOLOGY | Facility: CLINIC | Age: 59
End: 2024-04-18
Payer: COMMERCIAL

## 2024-04-18 VITALS
SYSTOLIC BLOOD PRESSURE: 135 MMHG | BODY MASS INDEX: 32.44 KG/M2 | DIASTOLIC BLOOD PRESSURE: 82 MMHG | HEIGHT: 73 IN | WEIGHT: 244.8 LBS | HEART RATE: 75 BPM

## 2024-04-18 DIAGNOSIS — I10 ESSENTIAL HYPERTENSION: Primary | ICD-10-CM

## 2024-04-18 DIAGNOSIS — I25.10 CORONARY ARTERY DISEASE INVOLVING NATIVE CORONARY ARTERY OF NATIVE HEART WITHOUT ANGINA PECTORIS: ICD-10-CM

## 2024-04-18 DIAGNOSIS — E78.2 MIXED HYPERLIPIDEMIA: ICD-10-CM

## 2024-04-18 RX ORDER — SIMVASTATIN 10 MG
10 TABLET ORAL NIGHTLY
Qty: 30 TABLET | Refills: 11 | Status: SHIPPED | OUTPATIENT
Start: 2024-04-18

## 2024-04-18 NOTE — PROGRESS NOTES
Chief Complaint  Hypertension, Hyperlipidemia, and Follow-up (6 mo f/u. )    Subjective        History of Present Illness  Reji Samuel presents to [facility] CARDIOLOGY for follow up.  Patient is a 58-year-old male with past medical history outlined below, significant for coronary artery disease status post previous PCI, hypertension, hyperlipidemia who presents for follow-up.  He is doing well from a cardiac standpoint.  He denies any chest pain or discomfort.  He has no dyspnea, orthopnea, edema, palpitations or syncope.      Past Medical History:   Diagnosis Date    Acid reflux     Coronary artery disease     HTN (hypertension)     Hyperlipidemia     STD (male)        ALLERGY  Allergies   Allergen Reactions    Benzocaine Hives    Codeine Nausea And Vomiting        Past Surgical History:   Procedure Laterality Date    APPENDECTOMY      CARDIAC CATHETERIZATION N/A 9/22/2021    Procedure: Left Heart Cath with possible coronary intervention;  Surgeon: Bg Palacios MD;  Location: [facility] CATH INVASIVE LOC[facility];  Service: Cardiology;  Laterality: N/A;    COLONOSCOPY      TONSILLECTOMY      TUMOR REMOVAL          Social History     Socioeconomic History    Marital status:    Tobacco Use    Smoking status: Every Day     Current packs/day: 0.50     Types: Cigarettes    Smokeless tobacco: Never   Vaping Use    Vaping status: Never Used   Substance and Sexual Activity    Alcohol use: Not Currently    Drug use: Not Currently    Sexual activity: Defer       Family History   Problem Relation Age of Onset    Hypertension Mother     Hypertension Father     Prostate cancer Paternal Uncle         Current Outpatient Medications on File Prior to Visit   Medication Sig    amLODIPine (NORVASC) 10 MG tablet Take 1 tablet by mouth Daily.    aspirin (aspirin) 81 MG EC tablet You must continue daily aspirin and Brilinta for at least 1 year after your stent    cetirizine (zyrTEC) 10 MG tablet Take 1  "tablet by mouth Daily.    ezetimibe (ZETIA) 10 MG tablet Take 1 tablet by mouth Daily.    metoprolol succinate XL (TOPROL-XL) 25 MG 24 hr tablet TAKE ONE TABLET BY MOUTH ONCE DAILY    omeprazole (priLOSEC) 20 MG capsule Take 1 capsule by mouth Daily As Needed.    sildenafil (REVATIO) 20 MG tablet 1 tablet po daily prn 45 minutes prior to activity.    [DISCONTINUED] atorvastatin (LIPITOR) 40 MG tablet Take 1 tablet by mouth Daily.    [DISCONTINUED] ticagrelor (Brilinta) 90 MG tablet tablet TAKE ONE TABLET BY MOUTH TWICE A DAY     No current facility-administered medications on file prior to visit.       Objective   Vitals:    04/18/24 1506   BP: 135/82   Pulse: 75   Weight: 111 kg (244 lb 12.8 oz)   Height: 185.4 cm (72.99\")       Physical Exam  Constitutional:       General: He is awake. He is not in acute distress.     Appearance: Normal appearance.   HENT:      Head: Normocephalic.      Nose: Nose normal. No congestion.   Eyes:      Extraocular Movements: Extraocular movements intact.      Conjunctiva/sclera: Conjunctivae normal.      Pupils: Pupils are equal, round, and reactive to light.   Neck:      Thyroid: No thyromegaly.      Vascular: No JVD.   Cardiovascular:      Rate and Rhythm: Normal rate and regular rhythm.      Chest Wall: PMI is not displaced.      Pulses: Normal pulses.      Heart sounds: Normal heart sounds, S1 normal and S2 normal. No murmur heard.     No friction rub. No gallop. No S3 or S4 sounds.   Pulmonary:      Effort: Pulmonary effort is normal.      Breath sounds: Normal breath sounds. No wheezing, rhonchi or rales.   Abdominal:      General: Bowel sounds are normal.      Palpations: Abdomen is soft.      Tenderness: There is no abdominal tenderness.   Musculoskeletal:      Cervical back: No tenderness.      Right lower leg: No edema.      Left lower leg: No edema.   Lymphadenopathy:      Cervical: No cervical adenopathy.   Skin:     General: Skin is warm and dry.      Capillary Refill: " Capillary refill takes less than 2 seconds.      Coloration: Skin is not cyanotic.      Findings: No petechiae or rash.      Nails: There is no clubbing.   Neurological:      Mental Status: He is alert.   Psychiatric:         Mood and Affect: Mood normal.         Behavior: Behavior is cooperative.           Result Review     The following data was reviewed by BRIELLE Nugent on 04/18/24.           Lipid Panel          4/12/2024    06:38   Lipid Panel   Total Cholesterol 206    Triglycerides 148    HDL Cholesterol 23    VLDL Cholesterol 27    LDL Cholesterol  156    LDL/HDL Ratio 6.67        Results for orders placed during the hospital encounter of 09/21/21    Adult Transthoracic Echo Complete W/ Cont if Necessary Per Protocol    Interpretation Summary  · Calculated left ventricular EF = 64% Estimated left ventricular EF was in agreement with the calculated left ventricular EF.  · Left ventricular diastolic function is consistent with (grade I) impaired relaxation.  · No significant valvular pathology.    Results for orders placed during the hospital encounter of 03/24/23    Stress Test With Myocardial Perfusion One Day    Interpretation Summary  Patient exercised for 7 minutes and 33 seconds into stage III of the standard Prasanth protocol achieving 89% of the maximum predicted heart rate and maximum workload of 7.6 METS.  No angina was reported during study.  Baseline blood pressure was mildly elevated with normal blood pressure response to exercise.  Normal heart rate response to exercise.  Rare PVCs with ventricular couplet and triplet were noted.  Adequate aerobic functional capacity.  Gated and SPECT images were obtained.  Image quality is adequate.  Attenuation artifact is present.  Overall, there is homogeneous radiotracer uptake without significant myocardial ischemia or fixed perfusion defects.  The left ventricle was normal in size with a calculated ejection fraction of 61%.  Overall impressions are  consistent with low risk for acute coronary events over the next 2-years.    No results found for this or any previous visit.          Procedures    Assessment & Plan  Diagnoses and all orders for this visit:    1. Essential hypertension (Primary)    2. Coronary artery disease involving native coronary artery of native heart without angina pectoris    3. Mixed hyperlipidemia        1.  Stable on current regimen.  Continue same.  2.  With no angina or anginal-like symptoms.  Okay to discontinue Brilinta.  Continue aspirin daily.  3.  Cholesterol is elevated.  Goal LDL less than 70.  Patient did not tolerate atorvastatin with leg cramps.  He will be started on simvastatin 10 mg daily.  If he does not tolerate this would recommend Repatha.  4.Reji CHINCHILLA Samuel  reports that he has been smoking cigarettes. He has never used smokeless tobacco. I have educated him on the risk of diseases from using tobacco products such as cancer, COPD, and heart disease.     I advised him to quit and he is not willing to quit.    I spent 3  minutes counseling the patient.            The medical services provided during this encounter are part of ongoing care related to this patient's single serious condition or complex condition.      Follow Up   Return in about 6 months (around 10/18/2024).    Patient was given instructions and counseling regarding his condition or for health maintenance advice. Please see specific information pulled into the AVS if appropriate.     Neha Salazar, APRN  04/18/24  15:23 EDT    Dictated Utilizing Dragon Dictation

## 2024-04-30 DIAGNOSIS — I10 ESSENTIAL HYPERTENSION: ICD-10-CM

## 2024-05-01 RX ORDER — AMLODIPINE BESYLATE 10 MG/1
10 TABLET ORAL DAILY
Qty: 90 TABLET | Refills: 3 | Status: SHIPPED | OUTPATIENT
Start: 2024-05-01

## 2024-05-18 DIAGNOSIS — E78.2 MIXED HYPERLIPIDEMIA: ICD-10-CM

## 2024-05-18 DIAGNOSIS — I25.10 CORONARY ARTERY DISEASE INVOLVING NATIVE CORONARY ARTERY OF NATIVE HEART WITHOUT ANGINA PECTORIS: ICD-10-CM

## 2024-05-20 RX ORDER — EZETIMIBE 10 MG/1
10 TABLET ORAL DAILY
Qty: 30 TABLET | Refills: 3 | Status: SHIPPED | OUTPATIENT
Start: 2024-05-20

## 2024-06-25 ENCOUNTER — OFFICE VISIT (OUTPATIENT)
Dept: ORTHOPEDIC SURGERY | Facility: CLINIC | Age: 59
End: 2024-06-25
Payer: COMMERCIAL

## 2024-06-25 VITALS
HEART RATE: 81 BPM | BODY MASS INDEX: 33.05 KG/M2 | OXYGEN SATURATION: 96 % | DIASTOLIC BLOOD PRESSURE: 87 MMHG | SYSTOLIC BLOOD PRESSURE: 138 MMHG | HEIGHT: 72 IN | WEIGHT: 244 LBS

## 2024-06-25 DIAGNOSIS — M79.641 RIGHT HAND PAIN: Primary | ICD-10-CM

## 2024-06-25 DIAGNOSIS — M18.11 ARTHRITIS OF CARPOMETACARPAL (CMC) JOINT OF RIGHT THUMB: ICD-10-CM

## 2024-06-25 DIAGNOSIS — M65.4 TENDINITIS, DE QUERVAIN'S: ICD-10-CM

## 2024-06-25 PROCEDURE — 20550 NJX 1 TENDON SHEATH/LIGAMENT: CPT | Performed by: ORTHOPAEDIC SURGERY

## 2024-06-25 PROCEDURE — 99203 OFFICE O/P NEW LOW 30 MIN: CPT | Performed by: ORTHOPAEDIC SURGERY

## 2024-06-25 RX ORDER — TRIAMCINOLONE ACETONIDE 40 MG/ML
40 INJECTION, SUSPENSION INTRA-ARTICULAR; INTRAMUSCULAR
Status: COMPLETED | OUTPATIENT
Start: 2024-06-25 | End: 2024-06-25

## 2024-06-25 RX ORDER — LIDOCAINE HYDROCHLORIDE 10 MG/ML
1 INJECTION, SOLUTION INFILTRATION; PERINEURAL
Status: COMPLETED | OUTPATIENT
Start: 2024-06-25 | End: 2024-06-25

## 2024-06-25 RX ADMIN — TRIAMCINOLONE ACETONIDE 40 MG: 40 INJECTION, SUSPENSION INTRA-ARTICULAR; INTRAMUSCULAR at 09:00

## 2024-06-25 RX ADMIN — LIDOCAINE HYDROCHLORIDE 1 ML: 10 INJECTION, SOLUTION INFILTRATION; PERINEURAL at 09:00

## 2024-06-25 NOTE — PROGRESS NOTES
"Chief Complaint  Initial Evaluation of the Right Hand and Initial Evaluation of the Right Wrist     Subjective      Reji Samuel presents to [facility] for initial evaluation of the right hand and wrist. He has had pain for 2-3 months.  He has swelling on the both sides of the hand.  He has a lot of pain in the thumb.  He has iced and used topical creams at times.  He had no injury.  He denies numbness in the fingers.  There was some numbness in the thumb and a little white knot that comes up at times.  He has had no treatment of the hand.     Allergies   Allergen Reactions    Benzocaine Hives    Codeine Nausea And Vomiting        Social History     Socioeconomic History    Marital status:    Tobacco Use    Smoking status: Every Day     Current packs/day: 0.50     Types: Cigarettes    Smokeless tobacco: Never   Vaping Use    Vaping status: Never Used   Substance and Sexual Activity    Alcohol use: Not Currently    Drug use: Not Currently    Sexual activity: Defer        I reviewed the patient's chief complaint, history of present illness, review of systems, past medical history, surgical history, family history, social history, medications, and allergy list.     Review of Systems     Constitutional: Denies fevers, chills, weight loss  Cardiovascular: Denies chest pain, shortness of breath  Skin: Denies rashes, acute skin changes  Neurologic: Denies headache, loss of consciousness      Vital Signs:   /87   Pulse 81   Ht 182.9 cm (72\")   Wt 111 kg (244 lb)   SpO2 96%   BMI 33.09 kg/m²          Physical Exam  General: Alert. No acute distress    Ortho Exam        RIGHT WRIST Negative Compression testing/ Negative Tinels.Mildly  PositiveFinkelsteins. Negative Zamora's testing. Positive CMC grind testing. Negative Phalens. Full ROM of the hand, fingers, elbow and wrist. Negative Triggering of the digit. Sensation grossly intact to light touch, median, radial and ulnar " nerve. Positive AIN, PIN and ulnar nerve motor function intact. Axillary nerve intact. Positive pulses.        Right Dequervains  Consent given by: patient  Site marked: site marked  Timeout: Immediately prior to procedure a time out was called to verify the correct patient, procedure, equipment, support staff and site/side marked as required   Procedure Details  Preparation: Patient was prepped and draped in the usual sterile fashion  Needle gauge: 23G.  Medications administered: 1 mL lidocaine 1 %; 40 mg triamcinolone acetonide 40 MG/ML  Patient tolerance: patient tolerated the procedure well with no immediate complications    This injection documentation was Scribed for Idris Pichardo MD by Angie Shrestha MA. 06/25/24 14:30 EDT      Imaging Results (Most Recent)       None             Result Review :          Assessment and Plan     Diagnoses and all orders for this visit:    1. Right hand pain (Primary)    2. Arthritis of carpometacarpal (CMC) joint of right thumb    3. Tendinitis, de Quervain's        Discussed the treatment plan with the patient.     Thumb brace given.  Prescribed anti inflammatory. HEP exercises.     Discussed the risks and benefits of conservative measures. The patient expressed understanding and wished to proceed with a right deQuerveins steroid injection.  He tolerated the injection well.     Educated on risk of smoking/nicotine. Discussed options for smoking cessation. and Call or return if worsening symptoms.    Follow Up     PRN       Patient was given instructions and counseling regarding his condition or for health maintenance advice. Please see specific information pulled into the AVS if appropriate.     Scribed for Idris Pichardo MD by Donna Siddiqi MA.  06/25/24   09:03 EDT    I have personally performed the services described in this document as scribed by the above individual and it is both accurate and complete. Idris Pichardo MD 06/25/24

## 2024-06-26 ENCOUNTER — TELEPHONE (OUTPATIENT)
Dept: ORTHOPEDIC SURGERY | Facility: CLINIC | Age: 59
End: 2024-06-26
Payer: COMMERCIAL

## 2024-06-26 DIAGNOSIS — M65.4 TENDINITIS, DE QUERVAIN'S: ICD-10-CM

## 2024-06-26 DIAGNOSIS — M79.641 RIGHT HAND PAIN: Primary | ICD-10-CM

## 2024-06-26 DIAGNOSIS — M18.11 ARTHRITIS OF CARPOMETACARPAL (CMC) JOINT OF RIGHT THUMB: ICD-10-CM

## 2024-06-26 RX ORDER — DICLOFENAC SODIUM 75 MG/1
75 TABLET, DELAYED RELEASE ORAL 2 TIMES DAILY
Qty: 60 TABLET | Refills: 2 | Status: SHIPPED | OUTPATIENT
Start: 2024-06-26

## 2024-06-26 NOTE — TELEPHONE ENCOUNTER
PATIENT'S WIFE STATES THAT DR. WOLF WAS SENDING IN ANTI-INFLAMMATORY AT TIME OF VISIT YESTERDAY BUT THE PHARMACY HAD NOT RECEIVED ANYTHING.   VOLTAREN 75MG 1 BID #60 2 REFILLS WAS WRITTEN ON ORDER SHEET.   [facility] [place].

## 2024-10-29 ENCOUNTER — OFFICE VISIT (OUTPATIENT)
Dept: ORTHOPEDIC SURGERY | Facility: CLINIC | Age: 59
End: 2024-10-29
Payer: COMMERCIAL

## 2024-10-29 VITALS
DIASTOLIC BLOOD PRESSURE: 85 MMHG | WEIGHT: 244 LBS | OXYGEN SATURATION: 95 % | BODY MASS INDEX: 33.05 KG/M2 | HEART RATE: 80 BPM | SYSTOLIC BLOOD PRESSURE: 162 MMHG | HEIGHT: 72 IN

## 2024-10-29 DIAGNOSIS — M79.641 RIGHT HAND PAIN: ICD-10-CM

## 2024-10-29 DIAGNOSIS — M18.11 ARTHRITIS OF CARPOMETACARPAL (CMC) JOINT OF RIGHT THUMB: Primary | ICD-10-CM

## 2024-10-29 RX ORDER — NAPROXEN 500 MG/1
500 TABLET ORAL 2 TIMES DAILY
Qty: 60 TABLET | Refills: 1 | Status: SHIPPED | OUTPATIENT
Start: 2024-10-29

## 2024-10-29 RX ADMIN — TRIAMCINOLONE ACETONIDE 40 MG: 40 INJECTION, SUSPENSION INTRA-ARTICULAR; INTRAMUSCULAR at 15:45

## 2024-10-29 RX ADMIN — LIDOCAINE HYDROCHLORIDE 1 ML: 10 INJECTION, SOLUTION EPIDURAL; INFILTRATION; INTRACAUDAL; PERINEURAL at 15:45

## 2024-10-29 NOTE — PROGRESS NOTES
"Chief Complaint  Follow-up of the Right Hand     Subjective      Reji Samuel presents to [facility] for follow up of the right hand.  He had a DeQuerveins in injection 6/25/24.  He notes the injection gave no relief.  He has been wearing a brace.  He can bend the tip of the thumb.  He is tender down the thumb and across the wrist.  He has drove fork lift for years. He notes the pain in his thumb is affecting his daily tasks and ADL's.      Allergies   Allergen Reactions    Benzocaine Hives    Codeine Nausea And Vomiting        Social History     Socioeconomic History    Marital status:    Tobacco Use    Smoking status: Every Day     Current packs/day: 0.50     Types: Cigarettes    Smokeless tobacco: Never   Vaping Use    Vaping status: Never Used   Substance and Sexual Activity    Alcohol use: Not Currently    Drug use: Not Currently    Sexual activity: Defer        I reviewed the patient's chief complaint, history of present illness, review of systems, past medical history, surgical history, family history, social history, medications, and allergy list.     Review of Systems     Constitutional: Denies fevers, chills, weight loss  Cardiovascular: Denies chest pain, shortness of breath  Skin: Denies rashes, acute skin changes  Neurologic: Denies headache, loss of consciousness        Vital Signs:   /85   Pulse 80   Ht 182.9 cm (72.01\")   Wt 111 kg (244 lb)   SpO2 95%   BMI 33.08 kg/m²          Physical Exam  General: Alert. No acute distress    Ortho Exam        RIGHT WRIST Negative Compression testing/ Negative Tinels.Mildly  PositiveFinkelsteins. Negative Zamora's testing. Positive CMC grind testing. Negative Phalens. Full ROM of the hand, fingers, elbow and wrist. Negative Triggering of the digit. Sensation grossly intact to light touch, median, radial and ulnar nerve. Positive AIN, PIN and ulnar nerve motor function intact. Axillary nerve intact. Positive pulses. " Mild swelling.            Small Joint Arthrocentesis  Consent given by: patient  Site marked: site marked  Timeout: Immediately prior to procedure a time out was called to verify the correct patient, procedure, equipment, support staff and site/side marked as required   Procedure Details  Location: -   Location: RIGHT THUMB.Preparation: Patient was prepped and draped in the usual sterile fashion  Needle gauge: 23G.  Medications administered: 1 mL lidocaine PF 1% 1 %; 40 mg triamcinolone acetonide 40 MG/ML  Patient tolerance: patient tolerated the procedure well with no immediate complications      This injection documentation was Scribed for Idris Pichardo MD by Lorenza Pena MA.  10/29/24   16:08 EDT      Imaging Results (Most Recent)       None             Result Review :             Assessment and Plan     Diagnoses and all orders for this visit:    1. Right hand pain (Primary)    2. Arthritis of carpometacarpal (CMC) joint of right thumb    Other orders  -     Small Joint Arthrocentesis        Discussed the treatment plan with the patient.     Continue brace as needed.      Discussed injection and anti inflammatory. Discussed referral to hand specialist.      Refer to Dr Lazaro.  Prescribed different anti inflammatory.      Discussed the risks and benefits of conservative measures. The patient expressed understanding and wished to proceed with a right CMC steroid injection. He tolerated the injection well.     Educated on risk of smoking/nicotine. Discussed options for smoking cessation. and Call or return if worsening symptoms.    Follow Up     PRN      Patient was given instructions and counseling regarding his condition or for health maintenance advice. Please see specific information pulled into the AVS if appropriate.     Scribed for Idris Pichardo MD by Donna Siddiqi MA.  10/29/24   15:59 EDT    I have personally performed the services described in this document as scribed by the above individual  and it is both accurate and complete. Idris Pichardo MD 10/31/24

## 2024-10-31 RX ORDER — LIDOCAINE HYDROCHLORIDE 10 MG/ML
1 INJECTION, SOLUTION EPIDURAL; INFILTRATION; INTRACAUDAL; PERINEURAL
Status: COMPLETED | OUTPATIENT
Start: 2024-10-29 | End: 2024-10-29

## 2024-10-31 RX ORDER — TRIAMCINOLONE ACETONIDE 40 MG/ML
40 INJECTION, SUSPENSION INTRA-ARTICULAR; INTRAMUSCULAR
Status: COMPLETED | OUTPATIENT
Start: 2024-10-29 | End: 2024-10-29

## 2024-11-01 ENCOUNTER — TELEPHONE (OUTPATIENT)
Dept: OTOLARYNGOLOGY | Facility: CLINIC | Age: 59
End: 2024-11-01

## 2024-11-01 NOTE — TELEPHONE ENCOUNTER
"    Caller: Reji Samuel \"PRISCILLA\"    Relationship to patient: Self    Best call back number: [phone]    Chief complaint: PT'S SYMPTOMS HAVE WORSEN, PT HAS CONGESTION 24/7 SND IS USING NASAL SPRAY 3 TIMES A DAY    Type of visit: FOLLOW UP    Requested date: FIRST THING IN THE MORNING OR LATE AFTERNOON    If rescheduling, when is the original appointment: 8/12/24  2:15P    Additional notes:OK TO LEAVE A VM         "

## 2024-11-06 ENCOUNTER — OFFICE VISIT (OUTPATIENT)
Dept: UROLOGY | Facility: CLINIC | Age: 59
End: 2024-11-06
Payer: COMMERCIAL

## 2024-11-06 VITALS
DIASTOLIC BLOOD PRESSURE: 84 MMHG | HEART RATE: 82 BPM | WEIGHT: 241 LBS | SYSTOLIC BLOOD PRESSURE: 135 MMHG | TEMPERATURE: 98.1 F | HEIGHT: 72 IN | BODY MASS INDEX: 32.64 KG/M2

## 2024-11-06 DIAGNOSIS — N52.8 OTHER MALE ERECTILE DYSFUNCTION: ICD-10-CM

## 2024-11-06 DIAGNOSIS — N40.0 BENIGN PROSTATIC HYPERPLASIA, UNSPECIFIED WHETHER LOWER URINARY TRACT SYMPTOMS PRESENT: Primary | ICD-10-CM

## 2024-11-06 PROBLEM — N50.811 PAIN IN RIGHT TESTICLE: Status: RESOLVED | Noted: 2021-10-14 | Resolved: 2024-11-06

## 2024-11-06 LAB
BILIRUB BLD-MCNC: NEGATIVE MG/DL
CLARITY, POC: CLEAR
COLOR UR: YELLOW
EXPIRATION DATE: NORMAL
GLUCOSE UR STRIP-MCNC: NEGATIVE MG/DL
KETONES UR QL: NEGATIVE
LEUKOCYTE EST, POC: NEGATIVE
Lab: NORMAL
NITRITE UR-MCNC: NEGATIVE MG/ML
PH UR: 5.5 [PH] (ref 5–8)
PROT UR STRIP-MCNC: NEGATIVE MG/DL
PSA SERPL-MCNC: 0.44 NG/ML (ref 0–4)
RBC # UR STRIP: NEGATIVE /UL
SP GR UR: 1 (ref 1–1.03)
UROBILINOGEN UR QL: NORMAL

## 2024-11-06 PROCEDURE — 84153 ASSAY OF PSA TOTAL: CPT | Performed by: NURSE PRACTITIONER

## 2024-11-06 RX ORDER — PREDNISONE 20 MG/1
TABLET ORAL
COMMUNITY
Start: 2024-10-14

## 2024-11-06 RX ORDER — NIRMATRELVIR AND RITONAVIR 300-100 MG
KIT ORAL
COMMUNITY
Start: 2024-10-14 | End: 2024-11-06

## 2024-11-06 RX ORDER — SILDENAFIL CITRATE 20 MG/1
TABLET ORAL
Qty: 30 TABLET | Refills: 3 | Status: SHIPPED | OUTPATIENT
Start: 2024-11-06

## 2024-11-06 RX ORDER — HYDROCODONE POLISTIREX AND CHLORPHENIRAMINE POLISTIREX 10; 8 MG/5ML; MG/5ML
SUSPENSION, EXTENDED RELEASE ORAL
COMMUNITY
Start: 2024-08-30

## 2024-11-06 NOTE — PROGRESS NOTES
"Chief Complaint  Benign prostatic hyperplasia, unspecified whether lower uri (Last PSA on 11/3/23 was 0.499. needs refill on sildenafil)    Subjective          Reji Samuel is a 59 y.o. male who presents to [facility]  History of Present Illness  Annual follow up of bph. Patient reports doing well urologically. Does have some urinary frequency but associated with consuming more liquids and water. Does not feel medication such as alpha-blocker needed. Denies testicular pain, testicle retracts into pelvic region and has to be repositioned at times. May have brief sharp pain on occasion right inguinal region during ejaculation. No significant changes since last annual visit. changes. Denies dysuria or gross hematuria. Using 1 tablet of 20mg sildenafil prn to help with firmness of erections. Unsure if some of it may be due to performance anxiety but it works well. Does not feel needing to change medication and requests refills. Not on nitrates. Denies new penile lesions or concerns of condyloma since treated per Dr Pena.        Objective   Vital Signs:   /84 (BP Location: Left arm, Patient Position: Sitting, Cuff Size: Adult)   Pulse 82   Temp 98.1 °F (36.7 °C) (Temporal)   Ht 182.9 cm (72.01\")   Wt 109 kg (241 lb)   BMI 32.68 kg/m²     Past Medical History:   Diagnosis Date    Acid reflux     Coronary artery disease     HTN (hypertension)     Hyperlipidemia     Pain in right testicle 10/14/2021    STD (male)      Past Surgical History:   Procedure Laterality Date    APPENDECTOMY      CARDIAC CATHETERIZATION N/A 9/22/2021    Procedure: Left Heart Cath with possible coronary intervention;  Surgeon: Bg Palacios MD;  Location: [facility] CATH INVASIVE LOC[facility];  Service: Cardiology;  Laterality: N/A;    COLONOSCOPY      TONSILLECTOMY      TUMOR REMOVAL       Family History   Problem Relation Age of Onset    Hypertension Mother     Hypertension Father     Prostate cancer " Paternal Uncle      Social History     Socioeconomic History    Marital status:    Tobacco Use    Smoking status: Every Day     Current packs/day: 0.50     Types: Cigarettes     Passive exposure: Current    Smokeless tobacco: Never   Vaping Use    Vaping status: Never Used   Substance and Sexual Activity    Alcohol use: Not Currently    Drug use: Not Currently    Sexual activity: Defer     Allergies   Allergen Reactions    Benzocaine Hives    Codeine Nausea And Vomiting     Current Outpatient Medications   Medication Sig Dispense Refill    amLODIPine (NORVASC) 10 MG tablet TAKE 1 TABLET BY MOUTH DAILY. 90 tablet 3    aspirin (aspirin) 81 MG EC tablet You must continue daily aspirin and Brilinta for at least 1 year after your stent 30 tablet 1    cetirizine (zyrTEC) 10 MG tablet Take 1 tablet by mouth Daily.      ezetimibe (ZETIA) 10 MG tablet TAKE 1 TABLET BY MOUTH DAILY. 30 tablet 3    Hydrocod Ruiz-Chlorphe Ruiz ER (TUSSIONEX PENNKINETIC) 10-8 MG/5ML ER suspension       metoprolol succinate XL (TOPROL-XL) 25 MG 24 hr tablet TAKE ONE TABLET BY MOUTH ONCE DAILY 90 tablet 3    naproxen (NAPROSYN) 500 MG tablet Take 1 tablet by mouth 2 (Two) Times a Day. 60 tablet 1    omeprazole (priLOSEC) 20 MG capsule Take 1 capsule by mouth Daily As Needed.      predniSONE (DELTASONE) 20 MG tablet       sildenafil (REVATIO) 20 MG tablet 1 tablet po daily prn 45 minutes prior to activity. 30 tablet 3    simvastatin (ZOCOR) 10 MG tablet Take 1 tablet by mouth Every Night. 30 tablet 11     No current facility-administered medications for this visit.         Review of Systems   Constitutional:  Negative for chills and fever.   Gastrointestinal:  Negative for abdominal pain.   Genitourinary:  Negative for difficulty urinating, dysuria and hematuria.        Physical Exam  Vitals and nursing note reviewed.   Constitutional:       General: He is not in acute distress.     Appearance: Normal appearance. He is well-developed. He is  not ill-appearing.      Comments: Ambulates without difficulty   Cardiovascular:      Rate and Rhythm: Normal rate.   Pulmonary:      Effort: Pulmonary effort is normal.      Breath sounds: Normal air entry.   Abdominal:      Tenderness: There is no abdominal tenderness. There is no guarding or rebound.   Genitourinary:     Testes:         Right: Tenderness not present.         Left: Tenderness not present.      Epididymis:      Right: No tenderness.      Left: No tenderness.      Comments: Prostate approximately 20-25g no nodules. Firm stool rectal vault   Musculoskeletal:         General: Normal range of motion.   Lymphadenopathy:      Lower Body: No right inguinal adenopathy. No left inguinal adenopathy.   Skin:     General: Skin is warm and dry.   Neurological:      Mental Status: He is alert and oriented to person, place, and time.      Motor: Motor function is intact.   Psychiatric:         Mood and Affect: Mood normal.         Behavior: Behavior normal. Behavior is cooperative.         Thought Content: Thought content normal.         Judgment: Judgment normal.        Result Review :     PSA   Date Value Ref Range Status   11/03/2023 0.499 0.000 - 4.000 ng/mL Final   11/01/2022 0.519 0.000 - 4.000 ng/mL Final   10/14/2021 0.529 0.000 - 4.000 ng/mL Final        Results for orders placed or performed in visit on 11/06/24   POC Urinalysis Dipstick, Automated    Collection Time: 11/06/24  4:27 PM    Specimen: Urine   Result Value Ref Range    Color Yellow Yellow, Straw, Dark Yellow, Yulia    Clarity, UA Clear Clear    Specific Gravity  1.005 1.005 - 1.030    pH, Urine 5.5 5.0 - 8.0    Leukocytes Negative Negative    Nitrite, UA Negative Negative    Protein, POC Negative Negative mg/dL    Glucose, UA Negative Negative mg/dL    Ketones, UA Negative Negative    Urobilinogen, UA 0.2 E.U./dL Normal, 0.2 E.U./dL    Bilirubin Negative Negative    Blood, UA Negative Negative    Lot Number [id number]     Expiration Date  7/31/25         Scrotum & Testicles (10/01/2021 08:38)    Office Visit with Marquita Bynum APRN (10/14/2021)   Procedure Visit Converted with Edi Pena MD (10/02/2020)     Assessment and Plan    Diagnoses and all orders for this visit:    1. Benign prostatic hyperplasia, unspecified whether lower urinary tract symptoms present (Primary)  -     POC Urinalysis Dipstick, Automated  -     PSA DIAGNOSTIC; Future  -     PSA DIAGNOSTIC  -     sildenafil (REVATIO) 20 MG tablet; 1 tablet po daily prn 45 minutes prior to activity.  Dispense: 30 tablet; Refill: 3    2. Other male erectile dysfunction       Patient does not feel need for medication for bph. May continue sildenafil prn and will refill. Will place referral to transfer care back to [facility] as followed per edgar Whitfield and Dr Pena past. PSA level collected. SOFIA performed.         Follow Up   Return for will refer back to Marquita Bynum [facility] for next annual visit..  Patient was given instructions and counseling regarding his condition or for health maintenance advice. Please see specific information pulled into the AVS if appropriate.     EDGAR Cartagena

## 2024-11-11 ENCOUNTER — TELEPHONE (OUTPATIENT)
Dept: CARDIOLOGY | Facility: CLINIC | Age: 59
End: 2024-11-11
Payer: COMMERCIAL

## 2024-11-11 NOTE — TELEPHONE ENCOUNTER
[facility] received a fax that requires your attention. The document has been indexed to the patient’s chart for your review.      Reason for sending: EXTERNAL MEDICAL RECORD NOTIFICATION     Documents Description: [employer]-11.11.24    Name of Sender: [employer]     Date Indexed: 11.11.24

## 2024-11-15 ENCOUNTER — OFFICE VISIT (OUTPATIENT)
Dept: CARDIOLOGY | Facility: CLINIC | Age: 59
End: 2024-11-15
Payer: COMMERCIAL

## 2024-11-15 VITALS
HEIGHT: 72 IN | WEIGHT: 237.8 LBS | HEART RATE: 65 BPM | BODY MASS INDEX: 32.21 KG/M2 | SYSTOLIC BLOOD PRESSURE: 139 MMHG | DIASTOLIC BLOOD PRESSURE: 91 MMHG

## 2024-11-15 DIAGNOSIS — E78.2 MIXED HYPERLIPIDEMIA: ICD-10-CM

## 2024-11-15 DIAGNOSIS — I25.10 CORONARY ARTERY DISEASE INVOLVING NATIVE CORONARY ARTERY OF NATIVE HEART WITHOUT ANGINA PECTORIS: ICD-10-CM

## 2024-11-15 DIAGNOSIS — I10 ESSENTIAL HYPERTENSION: ICD-10-CM

## 2024-11-15 PROCEDURE — 99214 OFFICE O/P EST MOD 30 MIN: CPT

## 2024-11-15 RX ORDER — AMLODIPINE BESYLATE 10 MG/1
10 TABLET ORAL DAILY
Qty: 90 TABLET | Refills: 3 | Status: SHIPPED | OUTPATIENT
Start: 2024-11-15

## 2024-11-15 RX ORDER — EZETIMIBE 10 MG/1
10 TABLET ORAL DAILY
Qty: 90 TABLET | Refills: 3 | Status: SHIPPED | OUTPATIENT
Start: 2024-11-15

## 2024-11-15 RX ORDER — SIMVASTATIN 10 MG
10 TABLET ORAL NIGHTLY
Qty: 30 TABLET | Refills: 11 | Status: SHIPPED | OUTPATIENT
Start: 2024-11-15

## 2024-11-15 RX ORDER — LOSARTAN POTASSIUM 25 MG/1
25 TABLET ORAL DAILY
Qty: 90 TABLET | Refills: 3 | Status: SHIPPED | OUTPATIENT
Start: 2024-11-15

## 2024-11-15 NOTE — PROGRESS NOTES
Chief Complaint  Hypertension, Coronary Artery Disease, and Hyperlipidemia    Subjective        History of Present Illness  Reji Samuel presents to [facility] for follow up.   Patient is a 59-year-old male with past medical history outlined below, significant for coronary artery disease status post previous PCI, hypertension, hyperlipidemia who presents for routine follow-up.  He is doing well from a cardiac standpoint.  He denies any chest pain or discomfort.  He has no dyspnea, orthopnea, edema, palpitations or syncope.  He is doing pretty well on the simvastatin.  He does have some very minor leg cramps but nothing too bothersome for him.  He is wanting to stay on this medication.    Past Medical History:   Diagnosis Date    Acid reflux     Coronary artery disease     HTN (hypertension)     Hyperlipidemia     Pain in right testicle 10/14/2021    STD (male)        ALLERGY  Allergies   Allergen Reactions    Benzocaine Hives    Codeine Nausea And Vomiting        Past Surgical History:   Procedure Laterality Date    APPENDECTOMY      CARDIAC CATHETERIZATION N/A 9/22/2021    Procedure: Left Heart Cath with possible coronary intervention;  Surgeon: Bg Palacios MD;  Location: [facility] CATH INVASIVE LOCATION;  Service: Cardiology;  Laterality: N/A;    COLONOSCOPY      TONSILLECTOMY      TUMOR REMOVAL          Social History     Socioeconomic History    Marital status:    Tobacco Use    Smoking status: Every Day     Current packs/day: 0.50     Types: Cigarettes     Passive exposure: Current    Smokeless tobacco: Never   Vaping Use    Vaping status: Never Used   Substance and Sexual Activity    Alcohol use: Not Currently    Drug use: Not Currently    Sexual activity: Defer       Family History   Problem Relation Age of Onset    Hypertension Mother     Hypertension Father     Prostate cancer Paternal Uncle         Current Outpatient Medications on File Prior to Visit  "  Medication Sig    aspirin (aspirin) 81 MG EC tablet You must continue daily aspirin and Brilinta for at least 1 year after your stent    cetirizine (zyrTEC) 10 MG tablet Take 1 tablet by mouth Daily.    Hydrocod Ruiz-Chlorphe Ruiz ER (TUSSIONEX PENNKINETIC) 10-8 MG/5ML ER suspension     metoprolol succinate XL (TOPROL-XL) 25 MG 24 hr tablet TAKE ONE TABLET BY MOUTH ONCE DAILY    naproxen (NAPROSYN) 500 MG tablet Take 1 tablet by mouth 2 (Two) Times a Day.    omeprazole (priLOSEC) 20 MG capsule Take 1 capsule by mouth Daily As Needed.    predniSONE (DELTASONE) 20 MG tablet     sildenafil (REVATIO) 20 MG tablet 1 tablet po daily prn 45 minutes prior to activity.    [DISCONTINUED] amLODIPine (NORVASC) 10 MG tablet TAKE 1 TABLET BY MOUTH DAILY.    [DISCONTINUED] ezetimibe (ZETIA) 10 MG tablet TAKE 1 TABLET BY MOUTH DAILY.    [DISCONTINUED] simvastatin (ZOCOR) 10 MG tablet Take 1 tablet by mouth Every Night.     No current facility-administered medications on file prior to visit.       Objective   Vitals:    11/15/24 1535 11/15/24 1543   BP: (!) 162/103 139/91   Pulse: 76 65   Weight: 108 kg (237 lb 12.8 oz)    Height: 182.9 cm (72.01\")        Physical Exam  Constitutional:       General: He is awake. He is not in acute distress.     Appearance: Normal appearance.   HENT:      Head: Normocephalic.      Nose: Nose normal. No congestion.   Eyes:      Extraocular Movements: Extraocular movements intact.      Conjunctiva/sclera: Conjunctivae normal.      Pupils: Pupils are equal, round, and reactive to light.   Neck:      Thyroid: No thyromegaly.      Vascular: No JVD.   Cardiovascular:      Rate and Rhythm: Normal rate and regular rhythm.      Chest Wall: PMI is not displaced.      Pulses: Normal pulses.      Heart sounds: Normal heart sounds, S1 normal and S2 normal. No murmur heard.     No friction rub. No gallop. No S3 or S4 sounds.   Pulmonary:      Effort: Pulmonary effort is normal.      Breath sounds: Normal breath " sounds. No wheezing, rhonchi or rales.   Abdominal:      General: Bowel sounds are normal.      Palpations: Abdomen is soft.      Tenderness: There is no abdominal tenderness.   Musculoskeletal:      Cervical back: No tenderness.      Right lower leg: No edema.      Left lower leg: No edema.   Lymphadenopathy:      Cervical: No cervical adenopathy.   Skin:     General: Skin is warm and dry.      Capillary Refill: Capillary refill takes less than 2 seconds.      Coloration: Skin is not cyanotic.      Findings: No petechiae or rash.      Nails: There is no clubbing.   Neurological:      Mental Status: He is alert.   Psychiatric:         Mood and Affect: Mood normal.         Behavior: Behavior is cooperative.           Result Review     The following data was reviewed by BRIELLE Nugent on 11/15/24.           Lipid Panel          4/12/2024    06:38   Lipid Panel   Total Cholesterol 206    Triglycerides 148    HDL Cholesterol 23    VLDL Cholesterol 27    LDL Cholesterol  156    LDL/HDL Ratio 6.67        Results for orders placed during the hospital encounter of 09/21/21    Adult Transthoracic Echo Complete W/ Cont if Necessary Per Protocol    Interpretation Summary  · Calculated left ventricular EF = 64% Estimated left ventricular EF was in agreement with the calculated left ventricular EF.  · Left ventricular diastolic function is consistent with (grade I) impaired relaxation.  · No significant valvular pathology.    Results for orders placed during the hospital encounter of 03/24/23    Stress Test With Myocardial Perfusion One Day    Interpretation Summary  Patient exercised for 7 minutes and 33 seconds into stage III of the standard Prasanth protocol achieving 89% of the maximum predicted heart rate and maximum workload of 7.6 METS.  No angina was reported during study.  Baseline blood pressure was mildly elevated with normal blood pressure response to exercise.  Normal heart rate response to exercise.  Rare  PVCs with ventricular couplet and triplet were noted.  Adequate aerobic functional capacity.  Gated and SPECT images were obtained.  Image quality is adequate.  Attenuation artifact is present.  Overall, there is homogeneous radiotracer uptake without significant myocardial ischemia or fixed perfusion defects.  The left ventricle was normal in size with a calculated ejection fraction of 61%.  Overall impressions are consistent with low risk for acute coronary events over the next 2-years.    No results found for this or any previous visit.          Procedures      Assessment & Plan  Coronary artery disease involving native coronary artery of native heart without angina pectoris  Status post PCI in 2021.  Doing well with no angina or anginal-like symptoms.  Continue the aspirin.  Mixed hyperlipidemia  LDL is not at goal.  Will repeat a lipid panel.  He is tolerating the simvastatin okay.  Essential hypertension  Blood pressure is mildly elevated and who reports elevated readings at home.  Add losartan 25 mg daily.  Continue to monitor at home.  Limit sodium intake.  Remain active.  The medical services provided during this encounter are part of ongoing care related to this patient's single serious condition or complex condition.  Follow Up   Return in about 6 months (around 5/15/2025) for With Dr. Palacios.    Patient was given instructions and counseling regarding his condition or for health maintenance advice. Please see specific information pulled into the AVS if appropriate.     Neha Salazar, APRN  11/15/24  15:53 EST    Dictated Utilizing Dragon Dictation

## 2024-11-15 NOTE — ASSESSMENT & PLAN NOTE
Status post PCI in 2021.  Doing well with no angina or anginal-like symptoms.  Continue the aspirin.

## 2024-11-15 NOTE — ASSESSMENT & PLAN NOTE
Blood pressure is mildly elevated and who reports elevated readings at home.  Add losartan 25 mg daily.  Continue to monitor at home.  Limit sodium intake.  [place] [address]
Phone:  [phone]                     ELECTROCARDIOGRAM REPORT      
_______________________________________________________________________________
 
Name:       DANDRE SCHULTZ                  Room:           [address]    ADM IN  
M.R.#:  [id number]      Account #:      [id number]  
Admission:  19     Attend Phys:    CHER Hollis
Discharge:               Date of Birth:  [birth year]  
         Report #: [id number]
    [id number]
_______________________________________________________________________________
THIS REPORT FOR:  //name//                      
 
                          [facility]
                                       
Test Date:    2019               Test Time:    06:18:37
Pat Name:     DANDRE SCHULTZ              Department:   
Patient ID:   [id number]            Room:         [address]
Gender:       M                        Technician:   RYAN
:          1955               Requested By: Cora Gray
Order Number: [id number]    Wai MD:   Miguel Angel Hoff
                                 Measurements
Intervals                              Axis          
Rate:         81                       P:            83
MO:           181                      QRS:          -35
QRSD:         138                      T:            106
QT:           442                                    
QTc:          513                                    
                           Interpretive Statements
Sinus rhythm
Ventricular premature complex
Left bundle branch block
 
 
Electronically Signed On 2019 9:02:47 CDT by Miguel Angel Hoff
[email or web address]
 
 
 
 
 
 
 
 
 
 
 
 
 
 
 
 
 
 
  <ELECTRONICALLY SIGNED>
                                           By: Miguel Angel Hoff MD, [facility]      
  19
D: 06/07/19 0618   _____________________________________
T: 19   Miguel Angel Hoff MD, FACC        /EPI

## 2025-01-13 ENCOUNTER — OFFICE VISIT (OUTPATIENT)
Dept: OTOLARYNGOLOGY | Facility: CLINIC | Age: 60
End: 2025-01-13
Payer: COMMERCIAL

## 2025-01-13 VITALS
DIASTOLIC BLOOD PRESSURE: 83 MMHG | SYSTOLIC BLOOD PRESSURE: 128 MMHG | HEIGHT: 72 IN | HEART RATE: 65 BPM | WEIGHT: 244 LBS | TEMPERATURE: 97.4 F | BODY MASS INDEX: 33.05 KG/M2

## 2025-01-13 DIAGNOSIS — G47.33 OBSTRUCTIVE SLEEP APNEA: Primary | ICD-10-CM

## 2025-01-13 DIAGNOSIS — J34.829 NASAL VALVE COLLAPSE: ICD-10-CM

## 2025-01-13 DIAGNOSIS — K11.8 MASS OF BOTH PAROTID GLANDS: ICD-10-CM

## 2025-01-13 DIAGNOSIS — J34.2 DEVIATED NASAL SEPTUM: ICD-10-CM

## 2025-01-13 DIAGNOSIS — R09.81 CHRONIC NASAL CONGESTION: ICD-10-CM

## 2025-01-13 DIAGNOSIS — Z98.890 HISTORY OF SUPERFICIAL PAROTIDECTOMY: ICD-10-CM

## 2025-01-13 DIAGNOSIS — J32.9 CHRONIC SINUSITIS, UNSPECIFIED LOCATION: ICD-10-CM

## 2025-01-13 PROCEDURE — 99214 OFFICE O/P EST MOD 30 MIN: CPT | Performed by: OTOLARYNGOLOGY

## 2025-01-13 NOTE — PATIENT INSTRUCTIONS
1.  Patient has obstructive sleep apnea still dependent on CPAP.  The machine is not working order right now and will continue to use it.  2.  Patient has significant nasal congestion and obstruction with recurrent sinusitis even specially since last visit.  I have notified patient of deviated nasal septum and the nasal deformities that might contribute to this.  He was not ready to make any decisions at that time.  I think at this point we need to probably proceed with CT of sinuses for further evaluation and evaluate the extent of pathology.  3.  Patient has bilateral parotid masses which are little bit concerning possibly even getting bigger.  I am going to hold off previously and if it continues to change we will consider obtaining needle biopsies.  4.  At this time I am going to see patient back after the CAT scan to further evaluate his nasal and sinus disease.

## 2025-01-13 NOTE — PROGRESS NOTES
Patient Name: Reji Samuel   Visit Date: 01/13/2025   Patient ID: [id number]  Provider: Papito Pacheco MD    Sex: male  Location: [facility]   YOB: 1965  Location Address: [address] [place] [address], [address] [place] [address],?[id number]    Primary Care Provider Tan Londono MD  Location Phone: [phone]    Referring Provider: No ref. provider found        Chief Complaint  follow up [employer] (C-pap supplies)    History of Present Illness  Reji Samuel is a 59 y.o. male who presents to [facility] for follow up [facility] (C-pap supplies).   Patient has history of obstructive sleep apnea and is also smoker on CPAP.  Patient's prior history of bilateral parotidectomy and adenotonsillectomy.  Patient complains of nasal obstruction or snoring which patient was identified with deviated nasal septum septum to the left that was confirmed on CT scan along with bilateral frontal sinus disease.  Patient will had been treated with Augmentin previously for 3 weeks and got some better.  Patient also have used nose cones for valve collapse which is still persistent.  Patient has a CPAP compliance report from April and May of last year but nothing recent.  Having CPAP use patient complains of nasal congestion all the time.  Patient is still on CPAP for the obstructive sleep apnea.  On next visit patient will try to bring the data for evaluation.  Patient not only have deviated nasal septum but also nasal valve collapse as well as chronic nasal congestion.  I have discussed the possible septoplasty as well as nasal valve repair and turbinate reduction or excision.  However previously I have taken care of chronic sinusitis and if he is entertaining surgery at some point we may have to consider getting repeat CT to further evaluate the sinuses.  I have asked him to quit smoking but I do not think he is quite ready yet.  I will see patient in 6 months with sleep data.  Patient  has bilateral parotid mass that is about a centimeter on palpation and according the patient that has not changed any.  He has had it removed by Dr. Alvarez Bella on the left side in 2013 and then by Dr. Kyler Pino on the right side and on 2017.  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  Now with a recurrence or new disease that have come about he may end up needing a biopsy and of course revision surgery.  However at this point I am just going to continue to observe and see whether it is growing before attempting to consider biopsy.    Patient reports that since last visit he has been having more frequent nasal congestion despite using oxymetazoline more frequently.  Patient without any use of the spray and or getting some relief is unable to use CPAP.  Patient's compliance data shows that his usage is about 64% and averaging about 5.8 hours/day.  Patient's pressure remains about on the average about 7 with an AHI on the average is about 2.1.  Apparently since 11/1820 24#12/20/2024 patient had admission breakdown and had to get a replacement for temporary during the period of time.  Past Medical History:   Diagnosis Date    Acid reflux     Coronary artery disease     HTN (hypertension)     Hyperlipidemia     Pain in right testicle 10/14/2021    STD (male)        Past Surgical History:   Procedure Laterality Date    APPENDECTOMY      CARDIAC CATHETERIZATION N/A 9/22/2021    Procedure: Left Heart Cath with possible coronary intervention;  Surgeon: Bg Palacios MD;  Location: [facility] INVASIVE LOCATION;  Service: Cardiology;  Laterality: N/A;    COLONOSCOPY      TONSILLECTOMY      TUMOR REMOVAL           Current Outpatient Medications:     amLODIPine (NORVASC) 10 MG tablet, Take 1 tablet by mouth Daily., Disp: 90 tablet, Rfl: 3    aspirin (aspirin) 81 MG EC tablet, You must continue daily aspirin and Brilinta for at least 1 year after your stent, Disp: 30 tablet, Rfl: 1    cetirizine (zyrTEC) 10 MG  "tablet, Take 1 tablet by mouth Daily., Disp: , Rfl:     ezetimibe (ZETIA) 10 MG tablet, Take 1 tablet by mouth Daily., Disp: 90 tablet, Rfl: 3    Hydrocod Ruiz-Chlorphe Ruiz ER (TUSSIONEX PENNKINETIC) 10-8 MG/5ML ER suspension, , Disp: , Rfl:     losartan (Cozaar) 25 MG tablet, Take 1 tablet by mouth Daily., Disp: 90 tablet, Rfl: 3    metoprolol succinate XL (TOPROL-XL) 25 MG 24 hr tablet, TAKE ONE TABLET BY MOUTH ONCE DAILY, Disp: 90 tablet, Rfl: 3    naproxen (NAPROSYN) 500 MG tablet, Take 1 tablet by mouth 2 (Two) Times a Day., Disp: 60 tablet, Rfl: 1    omeprazole (priLOSEC) 20 MG capsule, Take 1 capsule by mouth Daily As Needed., Disp: , Rfl:     predniSONE (DELTASONE) 20 MG tablet, , Disp: , Rfl:     sildenafil (REVATIO) 20 MG tablet, 1 tablet po daily prn 45 minutes prior to activity., Disp: 30 tablet, Rfl: 3    simvastatin (ZOCOR) 10 MG tablet, Take 1 tablet by mouth Every Night., Disp: 30 tablet, Rfl: 11     Allergies   Allergen Reactions    Benzocaine Hives    Codeine Nausea And Vomiting       Social History     Tobacco Use    Smoking status: Every Day     Current packs/day: 0.50     Types: Cigarettes     Passive exposure: Current    Smokeless tobacco: Never   Vaping Use    Vaping status: Never Used   Substance Use Topics    Alcohol use: Not Currently    Drug use: Not Currently        Objective     Vital Signs:   Vitals:    01/13/25 1644   BP: 128/83   Pulse: 65   Temp: 97.4 °F (36.3 °C)   Weight: 111 kg (244 lb)   Height: 182.9 cm (72.01\")       Tobacco Use: High Risk (1/13/2025)    Patient History     Smoking Tobacco Use: Every Day     Smokeless Tobacco Use: Never     Passive Exposure: Current         Physical Exam    Constitutional   Appearance  well developed, well-nourished, alert and in no acute distress, voice clear and strong    Head   Inspection  no deformities or lesions      Face   Inspection  no facial lesions; House-Brackmann I/VI bilaterally  Bilateral parotid masses are slightly larger at " 1.5 to 2 cm in the central portion of the parotid gland and rather firm to touch.   Palpation  no TMJ crepitus nor  muscle tenderness bilaterally     Eyes/Vision   Visual Fields  extraocular movements are intact, no spontaneous or gaze-induced nystagmus  Conjunctivae  clear   Sclerae  clear   Pupils and Irises  pupils equal, round, and reactive to light.   Nystagmus  not present     Ears, Nose, Mouth and Throat  Ears  External Ears  appearance within normal limits, no lesions present   Otoscopic Examination  tympanic membrane appearance within normal limits bilaterally without perforations, well-aerated middle ears   Hearing  intact to conversational voice both ears   Tunning fork testing    Rinne:  Baxter:    Nose  External Nose  appearance normal   Intranasal Exam  Patient has deviated nasal septum to the left on the caudal and and also has maxillary crest spurs on both sides with extensively enlarged congested inferior turbinates.  Modified Don Test:    Oral Cavity  Oral Mucosa  oral mucosa normal without pallor or cyanosis   Lips  lip appearance normal   Teeth  normal dentition for age   Gums  gums pink, non-swollen, no bleeding present   Tongue  tongue appearance normal; normal mobility   Palate  hard palate normal, soft palate appearance normal with symmetric mobility     Throat  Oropharynx  no inflammation or lesions present, tonsils within normal limits   Hypopharynx  appearance within normal limits   Larynx  voice normal     Neck  Inspection/Palpation  normal appearance, no masses or tenderness, trachea midline; thyroid size normal, nontender, no nodules or masses present on palpation     Lymphatic  Neck  no lymphadenopathy present   Supraclavicular Nodes  no lymphadenopathy present   Preauricular Nodes  no lymphadenopathy present     Respiratory  Respiratory Effort  breathing unlabored   Inspection of Chest  normal appearance, no retractions     Musculoskeletal   Cervical back: Normal range of  "motion and neck supple.      Skin and Subcutaneous Tissue  General Inspection  Regarding face and neck - there are no rashes present, no lesions present, and no areas of discoloration     Neurologic  Cranial Nerves  cranial nerves II-XII are grossly intact bilaterally   Gait and Station  normal gait, able to stand without diffculty    Psychiatric  Judgement and Insight  judgment and insight intact   Mood and Affect  mood normal, affect appropriate       RESULTS REVIEWED    I have reviewed the following information:   [x]  Previous Internal Note  []  Previous External Note:   [x]  Ordered Tests & Results:      Pathology: No results found for: \"MICRO\"    TSH   Date Value Ref Range Status   06/06/2019 1.380 0.270 - 4.200 m[iU]/L Final     Free T4   Date Value Ref Range Status   06/06/2019 1.0 0.9 - 1.8 ng/dL Final     Calcium   Date Value Ref Range Status   09/23/2021 9.3 8.6 - 10.5 mg/dL Final       XR Wrist Comp Min 3 Vw RT    Result Date: 11/20/2024  right wrist demonstrate degenerative change at thumb CMC joint loss of joint space and osteophytes present      I have discussed the interpretation of the above results with the patient.    Procedures          Assessment and Plan   Diagnoses and all orders for this visit:    1. Obstructive sleep apnea (Primary)    2. Deviated nasal septum  -     CT Sinus Without Contrast; Future    3. Chronic sinusitis, unspecified location  -     CT Sinus Without Contrast; Future    4. Nasal valve collapse  -     CT Sinus Without Contrast; Future    5. Chronic nasal congestion  -     CT Sinus Without Contrast; Future    6. History of superficial parotidectomy    7. Mass of both parotid glands        (G47.33) Obstructive sleep apnea    (J34.2) Deviated nasal septum - Plan: CT Sinus Without Contrast    (J32.9) Chronic sinusitis, unspecified location - Plan: CT Sinus Without Contrast    (J34.829) Nasal valve collapse - Plan: CT Sinus Without Contrast    (R09.81) Chronic nasal congestion - " Plan: CT Sinus Without Contrast    (Z98.890) History of superficial parotidectomy    (K11.8) Mass of both parotid glands     Reji Samuel  reports that he has been smoking cigarettes. He has been exposed to tobacco smoke. He has never used smokeless tobacco.     Plan:  Patient Instructions   1.  Patient has obstructive sleep apnea still dependent on CPAP.  The machine is not working order right now and will continue to use it.  2.  Patient has significant nasal congestion and obstruction with recurrent sinusitis even specially since last visit.  I have notified patient of deviated nasal septum and the nasal deformities that might contribute to this.  He was not ready to make any decisions at that time.  I think at this point we need to probably proceed with CT of sinuses for further evaluation and evaluate the extent of pathology.  3.  Patient has bilateral parotid masses which are little bit concerning possibly even getting bigger.  I am going to hold off previously and if it continues to change we will consider obtaining needle biopsies.  4.  At this time I am going to see patient back after the CAT scan to further evaluate his nasal and sinus disease.        Follow Up   Return in about 1 month (around 2/13/2025), or Follow-up after CT of sinus.  Patient was given instructions and counseling regarding his condition or for health maintenance advice. Please see specific information pulled into the AVS if appropriate.

## 2025-01-20 ENCOUNTER — TELEPHONE (OUTPATIENT)
Dept: OTOLARYNGOLOGY | Facility: CLINIC | Age: 60
End: 2025-01-20
Payer: COMMERCIAL

## 2025-01-20 NOTE — TELEPHONE ENCOUNTER
Left message with patient asking for a call back to schedule follow-up appointment with Dr. Pacheco. Told patient to ask for me.

## 2025-01-20 NOTE — TELEPHONE ENCOUNTER
PT RETURNED A MISSED PHONE CALL WITH [employer] TO ASK FOR JIMI. HUB ATTEMPTED TO WARM TRANSFER. UNABLE TO WARM TRANSFER. OFFICE PLEASE CALL BACK WITH THESE REGARDS. PT STATED HE WILL  HAVE HIS PHONE WITH HIM IF THE OFFICE CALLS BACK TODAY.

## 2025-01-30 ENCOUNTER — HOSPITAL ENCOUNTER (OUTPATIENT)
Dept: CT IMAGING | Facility: HOSPITAL | Age: 60
Discharge: HOME | End: 2025-01-30
Admitting: OTOLARYNGOLOGY
Payer: COMMERCIAL

## 2025-01-30 DIAGNOSIS — R09.81 CHRONIC NASAL CONGESTION: ICD-10-CM

## 2025-01-30 DIAGNOSIS — J34.829 NASAL VALVE COLLAPSE: ICD-10-CM

## 2025-01-30 DIAGNOSIS — J34.2 DEVIATED NASAL SEPTUM: ICD-10-CM

## 2025-01-30 DIAGNOSIS — J32.9 CHRONIC SINUSITIS, UNSPECIFIED LOCATION: ICD-10-CM

## 2025-01-30 PROCEDURE — 70486 CT MAXILLOFACIAL W/O DYE: CPT

## 2025-02-07 ENCOUNTER — OFFICE VISIT (OUTPATIENT)
Dept: OTOLARYNGOLOGY | Facility: CLINIC | Age: 60
End: 2025-02-07
Payer: COMMERCIAL

## 2025-02-07 VITALS
HEIGHT: 72 IN | HEART RATE: 70 BPM | WEIGHT: 239.2 LBS | BODY MASS INDEX: 32.4 KG/M2 | OXYGEN SATURATION: 97 % | SYSTOLIC BLOOD PRESSURE: 138 MMHG | DIASTOLIC BLOOD PRESSURE: 87 MMHG

## 2025-02-07 DIAGNOSIS — G47.33 OBSTRUCTIVE SLEEP APNEA: ICD-10-CM

## 2025-02-07 DIAGNOSIS — Z98.890 HISTORY OF SUPERFICIAL PAROTIDECTOMY: ICD-10-CM

## 2025-02-07 DIAGNOSIS — J32.9 CHRONIC SINUSITIS, UNSPECIFIED LOCATION: Primary | ICD-10-CM

## 2025-02-07 DIAGNOSIS — J34.829 NASAL VALVE COLLAPSE: ICD-10-CM

## 2025-02-07 DIAGNOSIS — J34.2 DEVIATED NASAL SEPTUM: ICD-10-CM

## 2025-02-07 DIAGNOSIS — K11.8 MASS OF BOTH PAROTID GLANDS: ICD-10-CM

## 2025-02-07 RX ORDER — FLUTICASONE PROPIONATE 50 MCG
2 SPRAY, SUSPENSION (ML) NASAL DAILY
Qty: 16 G | Refills: 6 | Status: SHIPPED | OUTPATIENT
Start: 2025-02-07

## 2025-02-07 RX ORDER — METHYLPREDNISOLONE 4 MG/1
TABLET ORAL
Qty: 1 EACH | Refills: 0 | Status: SHIPPED | OUTPATIENT
Start: 2025-02-07 | End: 2025-02-13

## 2025-02-07 NOTE — PATIENT INSTRUCTIONS
1.  Despite the fact that bilateral parotid tumors are slightly larger on CT scan, patient previously had Warthin's tumor removed and feels like if it is the same one that is back that is not malignant, he would rather watch for further rather than being operated at this time.  2.  Patient also had sinus disease that were most likely chronic with mucosal thickening.  I think rather than resort to surgery at this time, I would recommend trying oral steroid and that would help a little bit.  Also at this time patient has identified the fact that humidity and change in temperature whether its the environment or the CPAP is causing his breathing to be worse through his nose.  3.  However at this time you can go ahead and try the CPAP with a very low setting or no humidification and see how this takes him.  Also he may try to use some steroid nasal spray to help relieve some of the congestion as well.  4.  I will see him in 6 months for follow-up

## 2025-02-07 NOTE — PROGRESS NOTES
Patient Name: Reji Samuel   Visit Date: 02/07/2025   Patient ID: [id number]  Provider: Papito Pacheco MD    Sex: male  Location: [facility]   YOB: 1965  Location Address: [address] [place] [address], [address] [place] [address],?[place]?[id number]    Primary Care Provider Tan Londono MD  Location Phone: [phone]    Referring Provider: No ref. provider found        Chief Complaint  Chronic Sinusitis (Follow up Ct Results )    History of Present Illness  Reji Samuel is a 59 y.o. male who presents to [facility] for Chronic Sinusitis (Follow up Ct Results ).     Patient has history of obstructive sleep apnea and is also smoker on CPAP.  Patient's prior history of bilateral parotidectomy and adenotonsillectomy.  Patient complains of nasal obstruction or snoring which patient was identified with deviated nasal septum septum to the left that was confirmed on CT scan along with bilateral frontal sinus disease.  Patient will had been treated with Augmentin previously for 3 weeks and got some better.  Patient also have used nose cones for valve collapse which is still persistent.  Patient has a CPAP compliance report from April and May of last year but nothing recent.  Having CPAP use patient complains of nasal congestion all the time.  Patient is still on CPAP for the obstructive sleep apnea.  On next visit patient will try to bring the data for evaluation.  Patient not only have deviated nasal septum but also nasal valve collapse as well as chronic nasal congestion.  I have discussed the possible septoplasty as well as nasal valve repair and turbinate reduction or excision.  However previously I have taken care of chronic sinusitis and if he is entertaining surgery at some point we may have to consider getting repeat CT to further evaluate the sinuses.  I have asked him to quit smoking but I do not think he is quite ready yet.  I will see patient in 6 months  with sleep data.  Patient has bilateral parotid mass that is about a centimeter on palpation and according the patient that has not changed any.  He has had it removed by Dr. Alvarez Bella on the left side in 2013 and then by Dr. Kyler Pino on the right side and on 2017.  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  Now with a recurrence or new disease that have come about he may end up needing a biopsy and of course revision surgery.  However at this point I am just going to continue to observe and see whether it is growing before attempting to consider biopsy.     Patient reports that since last visit he has been having more frequent nasal congestion despite using oxymetazoline more frequently.  Patient without any use of the spray and or getting some relief is unable to use CPAP.  Patient's compliance data shows that his usage is about 64% and averaging about 5.8 hours/day.  Patient's pressure remains about on the average about 7 with an AHI on the average is about 2.1.  Apparently since 11/1820 24#12/20/2024 patient had admission breakdown and had to get a replacement for temporary during the period of time.    Patient has obstructive sleep apnea still dependent on CPAP.  The machine is not working order right now and will continue to use it.  Patient has significant nasal congestion and obstruction with recurrent sinusitis even specially since last visit.  I have notified patient of deviated nasal septum and the nasal deformities that might contribute to this.  He was not ready to make any decisions at that time.  I think at this point we need to probably proceed with CT of sinuses for further evaluation and evaluate the extent of pathology.  Patient has bilateral parotid masses which are little bit concerning possibly even getting bigger.  I am going to hold off previously and if it continues to change we will consider obtaining needle biopsies.  At this time I am going to see patient back after the CAT scan  to further evaluate his nasal and sinus disease.    Patient reports that since last visit every time he had a CPAP machine on the humidity was causing severe congestion of his nostrils and got to a point where he discontinued all the humidification and got better.  This was after he was off of CPAP for about a month that he went back on it.  CT scan shows the following findings.  1. Mild mucosal thickening within the bilateral sphenoid sinuses with occlusion of the sphenoethmoidal recesses. No air-fluid levels to suggest acute sinusitis.  2. Mild deviation of the bony nasal septum to the left, unchanged.  3. Bilateral parotid nodules which have increased in size slightly as detailed above. These could represent intraparotid lymph nodes or primary parotid neoplasm.  I reviewed the CT with patient.  Patient has bilateral parotid tumor which left side was removed in 2013 by Dr. Alvarez Childers and the right side was removed on 2017 by Dr. Alvarez De La Rosa.  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       Past Medical History:   Diagnosis Date    Acid reflux     Coronary artery disease     HTN (hypertension)     Hyperlipidemia     Pain in right testicle 10/14/2021    STD (male)        Past Surgical History:   Procedure Laterality Date    APPENDECTOMY      CARDIAC CATHETERIZATION N/A 9/22/2021    Procedure: Left Heart Cath with possible coronary intervention;  Surgeon: Bg Palacios MD;  Location: [facility] CATH INVASIVE LOC[facility];  Service: Cardiology;  Laterality: N/A;    COLONOSCOPY      TONSILLECTOMY      TUMOR REMOVAL           Current Outpatient Medications:     amLODIPine (NORVASC) 10 MG tablet, Take 1 tablet by mouth Daily., Disp: 90 tablet, Rfl: 3    aspirin (aspirin) 81 MG EC tablet, You must continue daily aspirin and Brilinta for at least 1 year after your stent, Disp: 30 tablet, Rfl: 1    cetirizine (zyrTEC) 10 MG tablet, Take 1 tablet by mouth Daily., Disp: , Rfl:     ezetimibe (ZETIA) 10 MG  "tablet, Take 1 tablet by mouth Daily., Disp: 90 tablet, Rfl: 3    Hydrocod Ruiz-Chlorphe Ruiz ER (TUSSIONEX PENNKINETIC) 10-8 MG/5ML ER suspension, , Disp: , Rfl:     losartan (Cozaar) 25 MG tablet, Take 1 tablet by mouth Daily., Disp: 90 tablet, Rfl: 3    metoprolol succinate XL (TOPROL-XL) 25 MG 24 hr tablet, TAKE ONE TABLET BY MOUTH ONCE DAILY, Disp: 90 tablet, Rfl: 3    naproxen (NAPROSYN) 500 MG tablet, Take 1 tablet by mouth 2 (Two) Times a Day., Disp: 60 tablet, Rfl: 1    omeprazole (priLOSEC) 20 MG capsule, Take 1 capsule by mouth Daily As Needed., Disp: , Rfl:     predniSONE (DELTASONE) 20 MG tablet, , Disp: , Rfl:     sildenafil (REVATIO) 20 MG tablet, 1 tablet po daily prn 45 minutes prior to activity., Disp: 30 tablet, Rfl: 3    simvastatin (ZOCOR) 10 MG tablet, Take 1 tablet by mouth Every Night., Disp: 30 tablet, Rfl: 11     Allergies   Allergen Reactions    Benzocaine Hives    Codeine Nausea And Vomiting       Social History     Tobacco Use    Smoking status: Every Day     Current packs/day: 0.50     Types: Cigarettes     Passive exposure: Current    Smokeless tobacco: Never   Vaping Use    Vaping status: Never Used   Substance Use Topics    Alcohol use: Not Currently    Drug use: Not Currently        Objective     Vital Signs:   Vitals:    02/07/25 1431   BP: 138/87   Pulse: 70   SpO2: 97%   Weight: 109 kg (239 lb 3.2 oz)   Height: 182.9 cm (72.01\")       Tobacco Use: High Risk (2/7/2025)    Patient History     Smoking Tobacco Use: Every Day     Smokeless Tobacco Use: Never     Passive Exposure: Current         Physical Exam    Constitutional   Appearance  well developed, well-nourished, alert and in no acute distress, voice clear and strong    Head   Inspection  no deformities or lesions      Face   Inspection  no facial lesions; House-Brackmann I/VI bilaterally   Palpation  no TMJ crepitus nor  muscle tenderness bilaterally     Eyes/Vision   Visual Fields  extraocular movements are " intact, no spontaneous or gaze-induced nystagmus  Conjunctivae  clear   Sclerae  clear   Pupils and Irises  pupils equal, round, and reactive to light.   Nystagmus  not present     Ears, Nose, Mouth and Throat  Ears  External Ears  appearance within normal limits, no lesions present   Otoscopic Examination  tympanic membrane appearance within normal limits bilaterally without perforations, well-aerated middle ears   Hearing  intact to conversational voice both ears   Tunning fork testing    Rinne:  Baxter:    Nose  External Nose  appearance normal   Intranasal Exam  mucosa within normal limits, vestibules normal, no intranasal lesions present, septum midline, sinuses non tender to percussion   Modified Sullivan Test:    Oral Cavity  Oral Mucosa  oral mucosa normal without pallor or cyanosis   Lips  lip appearance normal   Teeth  normal dentition for age   Gums  gums pink, non-swollen, no bleeding present   Tongue  tongue appearance normal; normal mobility   Palate  hard palate normal, soft palate appearance normal with symmetric mobility     Throat  Oropharynx  no inflammation or lesions present, tonsils within normal limits   Hypopharynx  appearance within normal limits   Larynx  voice normal     Neck  Inspection/Palpation  normal appearance, no masses or tenderness, trachea midline; thyroid size normal, nontender, no nodules or masses present on palpation     Lymphatic  Neck  no lymphadenopathy present   Supraclavicular Nodes  no lymphadenopathy present   Preauricular Nodes  no lymphadenopathy present     Respiratory  Respiratory Effort  breathing unlabored   Inspection of Chest  normal appearance, no retractions     Musculoskeletal   Cervical back: Normal range of motion and neck supple.      Skin and Subcutaneous Tissue  General Inspection  Regarding face and neck - there are no rashes present, no lesions present, and no areas of discoloration     Neurologic  Cranial Nerves  cranial nerves II-XII are grossly intact  "bilaterally   Gait and Station  normal gait, able to stand without diffculty    Psychiatric  Judgement and Insight  judgment and insight intact   Mood and Affect  mood normal, affect appropriate       RESULTS REVIEWED    I have reviewed the following information:   []  Previous Internal Note  []  Previous External Note:   []  Ordered Tests & Results:      Pathology: No results found for: \"MICRO\"    TSH   Date Value Ref Range Status   06/06/2019 1.380 0.270 - 4.200 m[iU]/L Final     Free T4   Date Value Ref Range Status   06/06/2019 1.0 0.9 - 1.8 ng/dL Final     Calcium   Date Value Ref Range Status   09/23/2021 9.3 8.6 - 10.5 mg/dL Final       CT Sinus Without Contrast    Result Date: 1/31/2025  Impression: 1. Mild mucosal thickening within the bilateral sphenoid sinuses with occlusion of the sphenoethmoidal recesses. No air-fluid levels to suggest acute sinusitis. 2. Mild deviation of the bony nasal septum to the left, unchanged. 3. Bilateral parotid nodules which have increased in size slightly as detailed above. These could represent intraparotid lymph nodes or primary parotid neoplasm. Electronically Signed: Bg Sweeney MD  1/31/2025 3:24 PM EST  Workstation ID: [id number]    XR Wrist Comp Min 3 Vw RT    Result Date: 11/20/2024  right wrist demonstrate degenerative change at thumb CMC joint loss of joint space and osteophytes present      I have discussed the interpretation of the above results with the patient.    Procedures          Assessment and Plan   Diagnoses and all orders for this visit:    1. Chronic sinusitis, unspecified location (Primary)    2. Obstructive sleep apnea    3. Deviated nasal septum    4. Nasal valve collapse    5. History of superficial parotidectomy    6. Mass of both parotid glands        (J32.9) Chronic sinusitis, unspecified location    (G47.33) Obstructive sleep apnea    (J34.2) Deviated nasal septum    (J34.829) Nasal valve collapse    (Z98.890) History of superficial " parotidectomy    (K11.8) Mass of both parotid glands     Reji CHINCHILLA Samuel  reports that he has been smoking cigarettes. He has been exposed to tobacco smoke. He has never used smokeless tobacco.         Plan:  Patient Instructions   1.  Despite the fact that bilateral parotid tumors are slightly larger on CT scan, patient previously had Warthin's tumor removed and feels like if it is the same one that is back that is not malignant, he would rather watch for further rather than being operated at this time.  2.  Patient also had sinus disease that were most likely chronic with mucosal thickening.  I think rather than resort to surgery at this time, I would recommend trying oral steroid and that would help a little bit.  Also at this time patient has identified the fact that humidity and change in temperature whether its the environment or the CPAP is causing his breathing to be worse through his nose.  3.  However at this time you can go ahead and try the CPAP with a very low setting or no humidification and see how this takes him.  Also he may try to use some steroid nasal spray to help relieve some of the congestion as well.  4.  I will see him in 6 months for follow-up        Follow Up   Return in about 6 months (around 8/7/2025), or Follow-up 6 months.  Patient was given instructions and counseling regarding his condition or for health maintenance advice. Please see specific information pulled into the AVS if appropriate.

## 2025-02-10 ENCOUNTER — TELEPHONE (OUTPATIENT)
Dept: OTOLARYNGOLOGY | Facility: CLINIC | Age: 60
End: 2025-02-10
Payer: COMMERCIAL

## 2025-02-10 NOTE — TELEPHONE ENCOUNTER
Caller: Lawrence Samuel     Relationship: [employer]    Best call back number: [phone]     What form or medical record are you requesting: WORK NOTE    Who is requesting this form or medical record from you: PT'S EMPLOYER    How would you like to receive the form or medical records (pick-up, mail, fax): FAX  If fax, what is the fax number: [phone]     Timeframe paperwork needed: ASAP    Additional notes: PT FORGOT TO GET A NOTE AT HIS APPT ON FRIDAY. PLEASE FAX IT TO THE PT'S WIFE..

## 2025-03-27 ENCOUNTER — TELEPHONE (OUTPATIENT)
Dept: UROLOGY | Age: 60
End: 2025-03-27

## 2025-03-27 NOTE — TELEPHONE ENCOUNTER
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

## 2025-05-02 ENCOUNTER — OFFICE VISIT (OUTPATIENT)
Dept: ORTHOPEDIC SURGERY | Facility: CLINIC | Age: 60
End: 2025-05-02
Payer: COMMERCIAL

## 2025-05-02 VITALS
OXYGEN SATURATION: 96 % | HEART RATE: 64 BPM | DIASTOLIC BLOOD PRESSURE: 81 MMHG | SYSTOLIC BLOOD PRESSURE: 122 MMHG | WEIGHT: 239 LBS | BODY MASS INDEX: 32.37 KG/M2 | HEIGHT: 72 IN

## 2025-05-02 DIAGNOSIS — M18.11 ARTHRITIS OF CARPOMETACARPAL (CMC) JOINT OF RIGHT THUMB: ICD-10-CM

## 2025-05-02 DIAGNOSIS — M79.641 RIGHT HAND PAIN: Primary | ICD-10-CM

## 2025-05-02 RX ORDER — LIDOCAINE HYDROCHLORIDE 10 MG/ML
1 INJECTION, SOLUTION INFILTRATION; PERINEURAL
Status: COMPLETED | OUTPATIENT
Start: 2025-05-02 | End: 2025-05-02

## 2025-05-02 RX ORDER — NAPROXEN 500 MG/1
500 TABLET ORAL 2 TIMES DAILY
Qty: 60 TABLET | Refills: 2 | Status: SHIPPED | OUTPATIENT
Start: 2025-05-02

## 2025-05-02 RX ORDER — TRIAMCINOLONE ACETONIDE 40 MG/ML
40 INJECTION, SUSPENSION INTRA-ARTICULAR; INTRAMUSCULAR
Status: COMPLETED | OUTPATIENT
Start: 2025-05-02 | End: 2025-05-02

## 2025-05-02 RX ADMIN — LIDOCAINE HYDROCHLORIDE 1 ML: 10 INJECTION, SOLUTION INFILTRATION; PERINEURAL at 09:45

## 2025-05-02 RX ADMIN — TRIAMCINOLONE ACETONIDE 40 MG: 40 INJECTION, SUSPENSION INTRA-ARTICULAR; INTRAMUSCULAR at 09:45

## 2025-05-02 NOTE — PROGRESS NOTES
"Chief Complaint  Follow-up of the Right Hand     Subjective      Reji Samuel presents to [facility]  for follow up of the right hand.  He had a DeQuerveins in injection 10/29/24.  He notes the injection gave no relief.  He has been wearing a brace.  He can bend the tip of the thumb.  He is tender down the thumb and across the wrist.  He has drove fork lift for years. He notes the pain in his thumb is affecting his daily tasks and ADL's.  HE saw Dr Lazaro's group and discussed surgical intervention.  He is here today for another CMC thumb injection.     Allergies   Allergen Reactions    Benzocaine Hives    Codeine Nausea And Vomiting        Social History     Socioeconomic History    Marital status:    Tobacco Use    Smoking status: Every Day     Current packs/day: 0.50     Types: Cigarettes     Passive exposure: Current    Smokeless tobacco: Never   Vaping Use    Vaping status: Never Used   Substance and Sexual Activity    Alcohol use: Not Currently    Drug use: Not Currently    Sexual activity: Defer        I reviewed the patient's chief complaint, history of present illness, review of systems, past medical history, surgical history, family history, social history, medications, and allergy list.     Review of Systems     Constitutional: Denies fevers, chills, weight loss  Cardiovascular: Denies chest pain, shortness of breath  Skin: Denies rashes, acute skin changes  Neurologic: Denies headache, loss of consciousness      Vital Signs:   /81   Pulse 64   Ht 182.9 cm (72.01\")   Wt 108 kg (239 lb)   SpO2 96%   BMI 32.41 kg/m²          Physical Exam  General: Alert. No acute distress    Ortho Exam        RIGHT WRIST Negative Compression testing/ Negative Tinels.Mildly  PositiveFinkelsteins. Negative Zamora's testing. Positive CMC grind testing. Negative Phalens. Full ROM of the hand, fingers, elbow and wrist. Negative Triggering of the digit. Sensation grossly intact to " light touch, median, radial and ulnar nerve. Positive AIN, PIN and ulnar nerve motor function intact. Axillary nerve intact. Positive pulses. Mild swelling.         Small Joint Arthrocentesis: R thumb CMC  Consent given by: patient  Site marked: site marked  Timeout: Immediately prior to procedure a time out was called to verify the correct patient, procedure, equipment, support staff and site/side marked as required   Procedure Details  Location: thumb - R thumb CMC  Preparation: Patient was prepped and draped in the usual sterile fashion  Medications administered: 1 mL lidocaine 1 %; 40 mg triamcinolone acetonide 40 MG/ML  Patient tolerance: patient tolerated the procedure well with no immediate complications    This injection documentation was Scribed for Idris Pichardo MD by USHA Whaley.  05/02/25   10:14 EDT      Imaging Results (Most Recent)       None             Result Review :             Assessment and Plan     Diagnoses and all orders for this visit:    1. Right hand pain (Primary)    2. Arthritis of carpometacarpal (CMC) joint of right thumb        Discussed the treatment plan with the patient.     Discussed the risks and benefits of conservative measure.s The patient expressed understanding and wished to proceed with a right thumb CMC injection.  He tolerated the injection well.     Discussed with the patient that due to the steroid injection given today in the office they may see an increase in blood sugar for a few days. Advised patient to monitor sugar after receiving the injection.     Discussed possibility of a reaction from the injection.  Discussed the possibility that the injection may not completely improve or remove the pain.  Discussed the risk of infection.    Prescribed anti inflammatory.     Educated on risk of smoking/nicotine. Discussed options for smoking cessation regarding chantix, nicorette gum and/ or to call the quit hotline at [phone]  and Call or return if  worsening symptoms.    Follow Up     PRN      Patient was given instructions and counseling regarding his condition or for health maintenance advice. Please see specific information pulled into the AVS if appropriate.     Scribed for Idris Pichardo MD by Donna Siddiqi MA.  05/02/25   10:09 EDT    I have personally performed the services described in this document as scribed by the above individual and it is both accurate and complete. Idris Pichardo MD 05/02/25

## 2025-06-11 ENCOUNTER — LAB (OUTPATIENT)
Dept: LAB | Facility: HOSPITAL | Age: 60
End: 2025-06-11
Payer: COMMERCIAL

## 2025-06-11 DIAGNOSIS — E78.2 MIXED HYPERLIPIDEMIA: ICD-10-CM

## 2025-06-11 LAB
CHOLEST SERPL-MCNC: 204 MG/DL (ref 0–200)
HDLC SERPL-MCNC: 26 MG/DL (ref 40–60)
LDLC SERPL CALC-MCNC: 153 MG/DL (ref 0–100)
LDLC/HDLC SERPL: 5.8 {RATIO}
TRIGL SERPL-MCNC: 136 MG/DL (ref 0–150)
VLDLC SERPL-MCNC: 25 MG/DL (ref 5–40)

## 2025-06-11 PROCEDURE — 36415 COLL VENOUS BLD VENIPUNCTURE: CPT

## 2025-06-11 PROCEDURE — 80061 LIPID PANEL: CPT

## 2025-06-12 ENCOUNTER — OFFICE VISIT (OUTPATIENT)
Dept: CARDIOLOGY | Facility: CLINIC | Age: 60
End: 2025-06-12
Payer: COMMERCIAL

## 2025-06-12 VITALS
HEIGHT: 72 IN | BODY MASS INDEX: 29.66 KG/M2 | WEIGHT: 219 LBS | HEART RATE: 69 BPM | DIASTOLIC BLOOD PRESSURE: 85 MMHG | SYSTOLIC BLOOD PRESSURE: 125 MMHG

## 2025-06-12 DIAGNOSIS — E78.2 MIXED HYPERLIPIDEMIA: ICD-10-CM

## 2025-06-12 DIAGNOSIS — I10 ESSENTIAL HYPERTENSION: ICD-10-CM

## 2025-06-12 DIAGNOSIS — I25.10 CORONARY ARTERY DISEASE INVOLVING NATIVE CORONARY ARTERY OF NATIVE HEART WITHOUT ANGINA PECTORIS: Primary | ICD-10-CM

## 2025-06-12 DIAGNOSIS — Z72.0 TOBACCO ABUSE: ICD-10-CM

## 2025-06-12 PROCEDURE — 99214 OFFICE O/P EST MOD 30 MIN: CPT | Performed by: INTERNAL MEDICINE

## 2025-06-12 NOTE — PROGRESS NOTES
Chief Complaint  Coronary Artery Disease (6m Follow up)    Subjective        Reji Smauel presents to [facility] CARDIOLOGY  History of present illness:    Patient states that he is cutting down on smoking and wants to quit by when he hits 60 years old.  He is taking the Zetia but not the Zocor as it was bothering him.  He was also tried on Lipitor 80 in the past and Crestor and did not tolerate these from muscle aches standpoints.  He is taking the aspirin with no bleeding problems.  He is exercising regularly with no chest pain.      Past Medical History:   Diagnosis Date    Acid reflux     Coronary artery disease     HTN (hypertension)     Hyperlipidemia     Pain in right testicle 10/14/2021    STD (male)          Past Surgical History:   Procedure Laterality Date    APPENDECTOMY      CARDIAC CATHETERIZATION N/A 9/22/2021    Procedure: Left Heart Cath with possible coronary intervention;  Surgeon: Bg Palacios MD;  Location: [facility] CATH INVASIVE LOCATION;  Service: Cardiology;  Laterality: N/A;    COLONOSCOPY      TONSILLECTOMY      TUMOR REMOVAL            Social History     Socioeconomic History    Marital status:    Tobacco Use    Smoking status: Every Day     Current packs/day: 0.50     Types: Cigarettes     Passive exposure: Current    Smokeless tobacco: Never   Vaping Use    Vaping status: Never Used   Substance and Sexual Activity    Alcohol use: Not Currently    Drug use: Not Currently    Sexual activity: Defer         Family History   Problem Relation Age of Onset    Hypertension Mother     Hypertension Father     Prostate cancer Paternal Uncle           Allergies   Allergen Reactions    Benzocaine Hives    Codeine Nausea And Vomiting            Current Outpatient Medications:     amLODIPine (NORVASC) 10 MG tablet, Take 1 tablet by mouth Daily., Disp: 90 tablet, Rfl: 3    aspirin (aspirin) 81 MG EC tablet, You must continue daily aspirin and Brilinta for at least 1  "year after your stent, Disp: 30 tablet, Rfl: 1    cetirizine (zyrTEC) 10 MG tablet, Take 1 tablet by mouth Daily., Disp: , Rfl:     ezetimibe (ZETIA) 10 MG tablet, Take 1 tablet by mouth Daily., Disp: 90 tablet, Rfl: 3    fluticasone (FLONASE) 50 MCG/ACT nasal spray, Administer 2 sprays into the nostril(s) as directed by provider Daily. Administer 2 sprays in each nostril for each dose., Disp: 16 g, Rfl: 6    losartan (Cozaar) 25 MG tablet, Take 1 tablet by mouth Daily., Disp: 90 tablet, Rfl: 3    metoprolol succinate XL (TOPROL-XL) 25 MG 24 hr tablet, TAKE ONE TABLET BY MOUTH ONCE DAILY, Disp: 90 tablet, Rfl: 3    omeprazole (priLOSEC) 20 MG capsule, Take 1 capsule by mouth Daily As Needed., Disp: , Rfl:     sildenafil (REVATIO) 20 MG tablet, 1 tablet po daily prn 45 minutes prior to activity., Disp: 30 tablet, Rfl: 3    Evolocumab (REPATHA) solution auto-injector SureClick injection, Inject 1 mL under the skin into the appropriate area as directed Every 14 (Fourteen) Days for 336 days., Disp: 2 mL, Rfl: 11    Hydrocod Ruiz-Chlorphe Ruiz ER (TUSSIONEX PENNKINETIC) 10-8 MG/5ML ER suspension, , Disp: , Rfl:     naproxen (NAPROSYN) 500 MG tablet, Take 1 tablet by mouth 2 (Two) Times a Day. (Patient not taking: Reported on 6/12/2025), Disp: 60 tablet, Rfl: 1    naproxen (NAPROSYN) 500 MG tablet, Take 1 tablet by mouth 2 (Two) Times a Day. (Patient not taking: Reported on 6/12/2025), Disp: 60 tablet, Rfl: 2    simvastatin (ZOCOR) 10 MG tablet, Take 1 tablet by mouth Every Night. (Patient not taking: Reported on 6/12/2025), Disp: 30 tablet, Rfl: 11      ROS:  Cardiac review of systems negative.    Objective     /85   Pulse 69   Ht 182.9 cm (72\")   Wt 99.3 kg (219 lb)   BMI 29.70 kg/m²       General Appearance:   well developed  well nourished  HENT:   oropharynx moist  lips not cyanotic  Respiratory:  no respiratory distress  normal breath sounds  no rales  Cardiovascular:  no jugular venous distention  regular " rhythm  S1 normal, S2 normal  no S3, no S4   no murmur  no rub, no thrill  No carotid bruit  pedal pulses normal  lower extremity edema: none    Musculoskeletal:  no clubbing of fingers.   normocephalic, head atraumatic  Skin:   warm, dry  Psychiatric:  judgement and insight appropriate  normal mood and affect    ECHO:  Results for orders placed during the hospital encounter of 09/21/21    Adult Transthoracic Echo Complete W/ Cont if Necessary Per Protocol    Interpretation Summary  · Calculated left ventricular EF = 64% Estimated left ventricular EF was in agreement with the calculated left ventricular EF.  · Left ventricular diastolic function is consistent with (grade I) impaired relaxation.  · No significant valvular pathology.    STRESS:  Results for orders placed during the hospital encounter of 03/24/23    Stress Test With Myocardial Perfusion One Day    Interpretation Summary  Patient exercised for 7 minutes and 33 seconds into stage III of the standard Prasanth protocol achieving 89% of the maximum predicted heart rate and maximum workload of 7.6 METS.  No angina was reported during study.  Baseline blood pressure was mildly elevated with normal blood pressure response to exercise.  Normal heart rate response to exercise.  Rare PVCs with ventricular couplet and triplet were noted.  Adequate aerobic functional capacity.  Gated and SPECT images were obtained.  Image quality is adequate.  Attenuation artifact is present.  Overall, there is homogeneous radiotracer uptake without significant myocardial ischemia or fixed perfusion defects.  The left ventricle was normal in size with a calculated ejection fraction of 61%.  Overall impressions are consistent with low risk for acute coronary events over the next 2-years.    CATH:  Results for orders placed during the hospital encounter of 09/21/21    Cardiac Catheterization/Vascular Study    Narrative  [facility]  CARDIAC CATHETERIZATION PROCEDURE  REPORT    Patient: Reji Samuel  : 1965  MRN: [id number]  Procedure Date: 21    Referring Physician:  Emergency department    Interventional Cardiologist:  Bg Palacios MD    Indication:  1. Chest pain  2. Non-ST segment elevation MI    Clinical Presentation:  Patient is a 55-year-old male who presented with chest pain and had a rise in his troponins.    Procedure performed:  1. Right radial arterial sheath placement  2. Left Heart Catheterization  3. Selective coronary Angiography  4. Left Ventriculography  5. Successful percutaneous coronary intervention to first OM with a 2.25 x 15 mm Xience drug-eluting stent  6. IFR of the mid and distal RCA that were nonsignificant  7. Moderate conscious sedation  8. TR band device placement for hemostasis    Details of the procedure:  Informed consent was obtained with an explanation of the risks, benefits and alternatives of the procedure. The patient was brought to the Cardiac Catheterization Laboratory in a fasting state.  The patient was prepped and draped in a standard, sterile fashion. Moderate conscious sedation with Fentanyl and Versed was administered by the circulating nurse. Lidocaine 2% was used to anesthetize the right radial artery and a 5/6 Uzbek glidesheath was placed via modified Seldinger technique.  TIG catheter was used to selectively engage both coronary arteries.  Multiple scintigraphic views were obtained.  The TIG catheter was used to cross the aortic valve.  Left heart hemodynamics and left ventriculogram was obtained.        Findings:  1. Coronary Artery Anatomy:  Dominance: Right  Left Main: Approximately 20% distal stenosis.  Gives off circumflex and LAD.  Left Anterior Descending: Moderate sized vessel.  Wraps the apex.  There is approximately 40% proximal stenosis.  In the mid segment there is mild diffuse disease.  Left Circumflex Artery: Gives off a high OM that has 95% ostial stenosis.  Gives off a distal PL branch.  In  the circumflex and PL branch there is mild diffuse disease.  Right Coronary Artery: Dominant for the posterior segment.  Proximal has mild diffuse disease less than or equal to 20%.  In the mid segment there is a 50 to 70% focal stenosis.  The mid to distal segment has a 60 to 70% focal stenosis.  Otherwise mild diffuse disease.    2. Hemodynamics:  The opening aortic pressure is 120/70 mmHg.  The left ventricular end-diastolic pressure is 12 mmHg.  There was no gradient across aortic valve on pullback      3. Left Ventriculogram:  Ejection Fraction: 55%  Wall Motion: Normal  Mitral Regurgitation: No significant    4. Percutaneous Intervention:  Location: OM1  Treatment: 2.25 x 15 mm Xience drug-eluting stent  Pre-stenosis: 95%  Post-stenosis: Near 0%  EMERITA Flow Pre: 3  EMERITA Flow Post: 3  Bifurcation: Yes  Severe Calcium: No  Dissection: No            Details of angioplasty:    Heparin was used for anticoagulation throughout the procedure with frequent checking of ACT.  A XB 3.5 guide catheter was used to selectively cannulate the left main coronary artery.  A run-through wire was used to selectively cannulate the OM 1 with mild manipulation but no major difficulty.  A 1.2 x 12 mm compliant balloon was used to predilate the stenosis and then a 2.25 x 15 mm Xience drug-eluting stent was deployed up to 14 evan.    Follow-up angiography revealed excellent results.  No dissections or wire perforations were noted.  The patient was given 325 mg ASA and 180 mg of Brilinta during the procedure.    A JR4 guide catheter was then used to selectively engage the RCA.  An Omni wire was used to selectively cannulate the RCA with mild manipulation but no major difficulty.  IFR was done on the mid lesion which came out to 0.96.  iFR was then done on the distal lesion that came out at 0.92.  Stenting was deferred at this time.    At the end of the procedure, the right radial arterial sheath was removed and a TR band was applied for  hemostasis.  Adequate hemostasis was achieved.  Patient tolerated procedure well without any complications.  The results of the test were explained in detail to the patient.    Cumulative fluoroscopy time: 22.1 min    Cumulative air kerma: 3777 mGy    Total amount of contrast used: 236 ml of Isovue      Complications:  None.    Estimated Blood Loss:  Minimal.    Conclusions:  1. Significant 95% ostial OM1 stenosis that appears to be the culprit vessel status post 2.25 x 15 mm Xience drug-eluting stent  2. IFR of the mid and distal RCA stenosis that were 0.96 and 0.92 respectively.  3. Normal ejection fraction  4. Normal LVEDP    Recommendations:  3. Continue aspirin and Brilinta for 1 year if possible.  Told him he cannot miss any of these doses.  4. Continue statin and beta-blocker.    Bg Palacios MD    09/22/21  10:30 EDT    BMP:     Glucose   Date Value Ref Range Status   09/23/2021 93 65 - 99 mg/dL Final     BUN   Date Value Ref Range Status   09/23/2021 11 6 - 20 mg/dL Final     Creatinine   Date Value Ref Range Status   09/23/2021 0.97 0.76 - 1.27 mg/dL Final     Sodium   Date Value Ref Range Status   09/23/2021 143 136 - 145 mmol/L Final     Potassium   Date Value Ref Range Status   09/23/2021 4.2 3.5 - 5.2 mmol/L Final     Chloride   Date Value Ref Range Status   09/23/2021 105 98 - 107 mmol/L Final     CO2   Date Value Ref Range Status   09/23/2021 28.0 22.0 - 29.0 mmol/L Final     Calcium   Date Value Ref Range Status   09/23/2021 9.3 8.6 - 10.5 mg/dL Final     BUN/Creatinine Ratio   Date Value Ref Range Status   09/23/2021 11.3 7.0 - 25.0 Final     Anion Gap   Date Value Ref Range Status   09/23/2021 10.0 5.0 - 15.0 mmol/L Final     LIPIDS:  Total Cholesterol   Date Value Ref Range Status   06/11/2025 204 (H) 0 - 200 mg/dL Final     Triglycerides   Date Value Ref Range Status   06/11/2025 136 0 - 150 mg/dL Final     HDL Cholesterol   Date Value Ref Range Status   06/11/2025 26 (L) 40 - 60 mg/dL Final      LDL Cholesterol    Date Value Ref Range Status   06/11/2025 153 (H) 0 - 100 mg/dL Final     VLDL Cholesterol   Date Value Ref Range Status   06/11/2025 25 5 - 40 mg/dL Final     LDL/HDL Ratio   Date Value Ref Range Status   06/11/2025 5.80  Final         Procedures             ASSESSMENT:  Diagnoses and all orders for this visit:    1. Coronary artery disease involving native coronary artery of native heart without angina pectoris (Primary)    2. Mixed hyperlipidemia    3. Essential hypertension    4. Tobacco abuse    Other orders  -     Evolocumab (REPATHA) solution auto-injector SureClick injection; Inject 1 mL under the skin into the appropriate area as directed Every 14 (Fourteen) Days for 336 days.  Dispense: 2 mL; Refill: 11         PLAN:    1.  Continue the aspirin.  The patient notes no problems with bleeding.  2.  Blood pressures under excellent control.  3.  Encouraged the patient to continue to remain active.  He has lost 20 pounds since February.  4.  Patient had cholesterol checked 6/11/2025 with , HDL 26, and triglycerides 136.  He has not tolerated initially Crestor, then Lipitor, and now Zocor.  He is taking the Zetia but his cholesterol is more than twice as high as we would want it.  I am going to send in Repatha.  If it is cost prohibitive he is going to call us.  We need to try to get his LDL less than 70.  5.  Encouraged continued cutting down on smoking with goal of quitting.  We talked about Chantix.  If he decides he wants to try Chantix he will give us a call.  6.  Patient had a non-STEMI with PCI to OM1 back in September 2021.  He had IFR of the RCA at that time that was negative.  His ejection fraction was normal with no significant valvular disease.      Return in about 6 months (around 12/12/2025).     Patient was given instructions and counseling regarding his condition or for health maintenance advice. Please see specific information pulled into the AVS if appropriate.          Bg Palacios MD   6/12/2025  15:28 EDT

## 2025-06-13 ENCOUNTER — TELEPHONE (OUTPATIENT)
Dept: CARDIOLOGY | Facility: CLINIC | Age: 60
End: 2025-06-13
Payer: COMMERCIAL

## 2025-06-13 NOTE — TELEPHONE ENCOUNTER
Caller: Lawrence Samuel    Relationship: Emergency Contact    Best call back number: CALL PATIENT WIFE NOT ON VERBAL     What form or medical record are you requesting: WORK EXCUSE FOR 6/12/25 APPOINTMENT     Who is requesting this form or medical record from you: PATIENT'S WIFE     How would you like to receive the form or medical records (pick-up, mail, fax): FAX     If fax, what is the fax number: [phone]    Timeframe paperwork needed: ASAP    Additional notes: PLEASE ALSO PUT IN MY CHART

## 2025-06-19 ENCOUNTER — SPECIALTY PHARMACY (OUTPATIENT)
Dept: CARDIOLOGY | Facility: CLINIC | Age: 60
End: 2025-06-19
Payer: COMMERCIAL

## 2025-06-19 RX ORDER — EVOLOCUMAB 140 MG/ML
140 INJECTION, SOLUTION SUBCUTANEOUS
Qty: 6 ML | Refills: 3 | Status: SHIPPED | OUTPATIENT
Start: 2025-06-19

## 2025-06-19 NOTE — PROGRESS NOTES
Specialty Pharmacy Patient Management Program  One-Time Clinical Outreach     Reji Samuel is a 59 y.o. male seen by a Cardiology provider for Hyperlipidemia and enrolled in the Cardiology Patient Management program offered by [facility].      Enrolled externally due to no payer access. Patient must fill specialty meds at [facility]; phone number [phone].    Kristie Carbajal PharmD  Clinical Specialty Pharmacist, Cardiology  6/19/2025  14:54 EDT

## 2025-07-09 ENCOUNTER — TELEPHONE (OUTPATIENT)
Dept: CARDIOLOGY | Facility: CLINIC | Age: 60
End: 2025-07-09
Payer: COMMERCIAL

## 2025-07-09 NOTE — TELEPHONE ENCOUNTER
Received voicemail with questions about his Repatha rx and that he had not received it from the pharmacy or heard anything from the pharmacy. I called and verified with the pharmacy that they had the rx and they stated they could not find his enrollment, said he could call and do enrollment over the phone. I called patient back to let him know that he could do this over the phone and get his shipment set up. Provided patient with phone number. Pt voiced understanding.    Kristie Carbajal, Pharm.D.

## 2025-08-14 ENCOUNTER — OFFICE VISIT (OUTPATIENT)
Dept: OTOLARYNGOLOGY | Facility: CLINIC | Age: 60
End: 2025-08-14
Payer: COMMERCIAL

## 2025-08-14 VITALS
HEIGHT: 72 IN | WEIGHT: 218 LBS | TEMPERATURE: 98.4 F | HEART RATE: 60 BPM | DIASTOLIC BLOOD PRESSURE: 83 MMHG | BODY MASS INDEX: 29.53 KG/M2 | SYSTOLIC BLOOD PRESSURE: 135 MMHG

## 2025-08-14 DIAGNOSIS — Z98.890 HISTORY OF SUPERFICIAL PAROTIDECTOMY: ICD-10-CM

## 2025-08-14 DIAGNOSIS — J34.2 DEVIATED NASAL SEPTUM: ICD-10-CM

## 2025-08-14 DIAGNOSIS — J34.829 NASAL VALVE COLLAPSE: ICD-10-CM

## 2025-08-14 DIAGNOSIS — G47.33 OBSTRUCTIVE SLEEP APNEA: Primary | ICD-10-CM

## 2025-08-14 DIAGNOSIS — J32.9 CHRONIC SINUSITIS, UNSPECIFIED LOCATION: ICD-10-CM

## 2025-08-14 DIAGNOSIS — K11.8 MASS OF BOTH PAROTID GLANDS: ICD-10-CM

## (undated) DEVICE — DEV INFL BASIXCOMPAK W/INTRO/20G

## (undated) DEVICE — Device: Brand: OMNIWIRE PRESSURE GUIDE WIRE

## (undated) DEVICE — GLIDESHEATH SLENDER STAINLESS STEEL KIT: Brand: GLIDESHEATH SLENDER

## (undated) DEVICE — CVR PROB ULTRASND GLS STRL

## (undated) DEVICE — 6F .070 JR 4 100CM: Brand: CORDIS

## (undated) DEVICE — CONTAINER,SPECIMEN,O.R.STRL,4.5OZ: Brand: MEDLINE

## (undated) DEVICE — A2000 MULTI-USE SYRINGE KIT, P/N 701277-003KIT CONTENTS: 100ML CONTRAST RESERVOIR AND TUBING WITH CONTRAST SPIKE AND CLAMP: Brand: A2000 MULTI-USE SYRINGE KIT

## (undated) DEVICE — DECANTER: Brand: UNBRANDED

## (undated) DEVICE — GW FC FLOP/TP .035 260CM 3MM

## (undated) DEVICE — 6F .070 XB 3.5 100CM: Brand: VISTA BRITE TIP

## (undated) DEVICE — MODEL BT2000 P/N 700287-012KIT CONTENTS: MANIFOLD WITH SALINE AND CONTRAST PORTS, SALINE TUBING WITH SPIKE AND HAND SYRINGE, TRANSDUCER: Brand: BT2000 AUTOMATED MANIFOLD KIT

## (undated) DEVICE — TR BAND RADIAL ARTERY COMPRESSION DEVICE: Brand: TR BAND

## (undated) DEVICE — SHT AIR TRANSFR COMFRT GLIDE LAT 40X80IN

## (undated) DEVICE — RADIFOCUS OPTITORQUE ANGIOGRAPHIC CATHETER: Brand: OPTITORQUE

## (undated) DEVICE — MODEL AT P65, P/N 701554-001KIT CONTENTS: HAND CONTROLLER, 3-WAY HIGH-PRESSURE STOPCOCK WITH ROTATING END AND PREMIUM HIGH-PRESSURE TUBING: Brand: ANGIOTOUCH® KIT

## (undated) DEVICE — CATH LAB PACK: Brand: MEDLINE INDUSTRIES, INC.

## (undated) DEVICE — MINI TREK CORONARY DILATATION CATHETER 1.20 MM X 12 MM / RAPID-EXCHANGE: Brand: MINI TREK

## (undated) DEVICE — DRSNG SURESITE WNDW 4X4.5

## (undated) DEVICE — RUNTHROUGH NS EXTRA FLOPPY PTCA GUIDEWIRE: Brand: RUNTHROUGH

## (undated) DEVICE — BLD CLIP UNIV SURG GRY

## (undated) DEVICE — CONTRST ISOVUE370 76PCT 100ML

## (undated) DEVICE — GLV SURG SENSICARE SLT PF LF 7.5 STRL

## (undated) DEVICE — APPL CHLORAPREP HI/LITE 26ML ORNG

## (undated) DEVICE — CUFF,BP,DISP,1TB,LG ADULT,HP: Brand: MEDLINE